# Patient Record
Sex: MALE | Race: ASIAN | Employment: FULL TIME | URBAN - METROPOLITAN AREA
[De-identification: names, ages, dates, MRNs, and addresses within clinical notes are randomized per-mention and may not be internally consistent; named-entity substitution may affect disease eponyms.]

---

## 2017-07-13 ENCOUNTER — OFFICE VISIT (OUTPATIENT)
Dept: FAMILY MEDICINE CLINIC | Age: 37
End: 2017-07-13

## 2017-07-13 VITALS
TEMPERATURE: 98 F | WEIGHT: 172 LBS | HEIGHT: 69 IN | SYSTOLIC BLOOD PRESSURE: 102 MMHG | HEART RATE: 64 BPM | RESPIRATION RATE: 14 BRPM | OXYGEN SATURATION: 99 % | DIASTOLIC BLOOD PRESSURE: 68 MMHG | BODY MASS INDEX: 25.48 KG/M2

## 2017-07-13 DIAGNOSIS — M25.531 RIGHT WRIST PAIN: Primary | ICD-10-CM

## 2017-07-13 DIAGNOSIS — R25.1 TREMOR: ICD-10-CM

## 2017-07-13 DIAGNOSIS — R22.31 MASS OF RIGHT WRIST: ICD-10-CM

## 2017-07-13 NOTE — MR AVS SNAPSHOT
Visit Information Date & Time Provider Department Dept. Phone Encounter #  
 7/13/2017  2:30 PM Noelle Clark, [address] [place] [address] Road [id number] Follow-up Instructions Return by phone for MRI results. Upcoming Health Maintenance Date Due Pneumococcal 19-64 Medium Risk (1 of 1 - PPSV23) 2/17/1998 INFLUENZA AGE 9 TO ADULT 8/1/2017 DTaP/Tdap/Td series (2 - Td) 3/4/2019 Allergies as of 7/13/2017  Review Complete On: 7/13/2017 By: Noelle Clark MD  
 No Known Allergies Current Immunizations  Reviewed on 7/14/2014 Name Date Tdap 3/4/2009 Not reviewed this visit You Were Diagnosed With   
  
 Codes Comments Right wrist pain    -  Primary ICD-10-CM: M25.531 ICD-9-CM: 719.43 Mass of right wrist     ICD-10-CM: R22.31 
ICD-9-CM: 719.63 Tremor     ICD-10-CM: R25.1 ICD-9-CM: 636. 0 Vitals BP Pulse Temp Resp Height(growth percentile) Weight(growth percentile) 102/68 (BP 1 Location: Left arm, BP Patient Position: Sitting) 64 98 °F (36.7 °C) (Oral) 14 5' 9\" (1.753 m) 172 lb (78 kg) SpO2 BMI Smoking Status 99% 25.4 kg/m2 Light Tobacco Smoker Vitals History BMI and BSA Data Body Mass Index Body Surface Area  
 25.4 kg/m 2 1.95 m 2 Preferred Pharmacy Pharmacy Name Phone [facility] [address] [facility], [address] [place] [address] Road [phone] Your Updated Medication List  
  
   
This list is accurate as of: 7/13/17  2:57 PM.  Always use your most recent med list.  
  
  
  
  
 acetaminophen 500 mg tablet Commonly known as:  [address] Take 2 Tabs by mouth every six (6) hours as needed for Pain. ibuprofen 800 mg tablet Commonly known as:  MOTRIN Take 1 Tab by mouth every six (6) hours as needed for Pain. ondansetron 4 mg disintegrating tablet Commonly known as:  ZOFRAN ODT Take 1 Tab by mouth every eight (8) hours as needed for Nausea. SUMAtriptan 50 mg tablet Commonly known as:  IMITREX Take 1 tablet PRN for migraine and repeat in about 2 hours if needed Follow-up Instructions Return by phone for MRI results. To-Do List   
 07/13/2017 Imaging:  MRI WRIST RT WO CONT Patient Instructions Benign Essential Tremor: Care Instructions Your Care Instructions Benign essential tremor is a medical term for shaking that you can't control. Your hand or fingers may shake when you lift a cup or point at something. Or your voice may shake when you speak. This type of tremor is not harmful. It is not caused by a stroke or Parkinson's disease. Some things can affect how much you shake. For example, drinking or eating something with caffeine may make tremors worse for a while. Some medicines also can increase tremors. These include antidepressants and too much thyroid replacement. Talk to your doctor if you think one of your medicines makes your tremors worse. If you are self-conscious about your tremors, there are some things you can do to reduce them or make them less noticeable. This includes taking medicine. Follow-up care is a key part of your treatment and safety. Be sure to make and go to all appointments, and call your doctor if you are having problems. It's also a good idea to know your test results and keep a list of the medicines you take. How can you care for yourself at home? · Take your medicines exactly as prescribed. Call your doctor if you think you are having a problem with your medicine. Some medicines that help control tremors have to be taken every day, even if you are not having tremors. You will get more details on the specific medicines your doctor prescribes. · Get plenty of rest. 
· Eat a balanced, healthy diet. · Try to reduce stress. Regular exercise and massages may help. · Limit alcohol. Heavy drinking can make your tremors worse. · Avoid drinks or foods with caffeine if they make your tremors worse. These include tea, cola, coffee, and chocolate. · Wear a heavy bracelet or watch. This adds a little weight to your hand. The extra weight may reduce tremors. · Drink from cups or glasses that are only half full. You may also want to try drinking with a straw. When should you call for help? Watch closely for changes in your health, and be sure to contact your doctor if: 
· You notice your tremors are getting worse. · You can't do your everyday activities because of your tremors. · You are sad and embarrassed about your shaking. Where can you learn more? Go to http://lia-candace.info/. Enter B746 in the search box to learn more about \"Benign Essential Tremor: Care Instructions. \" Current as of: October 14, 2016 Content Version: 11.3 © 5895-6118 [employer]. Care instructions adapted under license by [employer] (which disclaims liability or warranty for this information). If you have questions about a medical condition or this instruction, always ask your healthcare professional. [place] [address] any warranty or liability for your use of this information. Introducing [facility]! Dear Papa Weinstein: Thank you for requesting a [employer] account. Our records indicate that you already have an active [employer] account. You can access your account anytime at [email or web address] Did you know that you can access your hospital and ER discharge instructions at any time in [employer]? You can also review all of your test results from your hospital stay or ER visit. Additional Information If you have questions, please visit the Frequently Asked Questions section of the [employer] website at [email or web address] Remember, [employer] is NOT to be used for urgent needs. For medical emergencies, dial 911. Now available from your iPhone and Android! Please provide this summary of care documentation to your next provider. Your primary care clinician is listed as Vernon Fields. If you have any questions after today's visit, please call [phone].

## 2017-07-13 NOTE — PROGRESS NOTES
SUBJECTIVE  Chief Complaint   Patient presents with    Wrist Pain     right wrist pain when flexed x 1 month; + injury when he fell on wrist     Tremors     bilateral hands x 1 year     Patient presents for right ventral radial wrist pain for 1 month. He had a [address] injury during a [employer] race 1 month ago. Initially had pain that improved over a few weeks. He says that he was doing handstand pushups and had pain reoccur / worsen. He has had a swollen area that is tender. He also has noticed a tremor at certain times of the day for the past year. He has found no correlation to diet. He is on a preworkout supplement but cycled off and this did not affect it. No other dietary changes. No family history of similar. OBJECTIVE    Blood pressure 102/68, pulse 64, temperature 98 °F (36.7 °C), temperature source Oral, resp. rate 14, height 5' 9\" (1.753 m), weight 172 lb (78 kg), SpO2 99 %. General:  Alert, cooperative, well appearing, in no apparent distress. CV:  The heart sounds are regular in rate and rhythm. There is a normal S1 and S2.   Lungs: Inspiratory and expiratory efforts are full and unlabored. Skin:  No rashes over wrist.  Right wrist:  There is a ventral 2cm lump that is most prominent when he extends his wrist.  It is mildly tender. He has pain with flexion as well. He has a limitation of ROM by 5 degrees in extension. Mild tenderness of the anatomical snuffbox as well. Neuro:  No deficits. Gait is normal.  No resting tremor at this time. ASSESSMENT / PLAN    ICD-10-CM ICD-9-CM    1. Right wrist pain M25.531 719.43 MRI WRIST RT WO CONT   2. Mass of right wrist R22.31 719.63 MRI WRIST RT WO CONT   3. Tremor R25.1 781.0      Right wrist pain with possible mass - this could be a ligamentous injury like a scapholunate issues or a ganglion. He needs advanced imaging. Activity as tolerated. Tremor - monitor for now. Review benign essential tremor handout.      All chart history elements were reviewed by me at the time of the visit even though marked at time of note closure. Patient understands our medical plan. Patient has provided input and agrees with goals. Alternatives have been explained and offered. All questions answered. The patient is to call if condition worsens or fails to improve. Follow-up Disposition:  Return by phone for MRI results.

## 2017-07-13 NOTE — PATIENT INSTRUCTIONS
Benign Essential Tremor: Care Instructions  Your Care Instructions  Benign essential tremor is a medical term for shaking that you can't control. Your hand or fingers may shake when you lift a cup or point at something. Or your voice may shake when you speak. This type of tremor is not harmful. It is not caused by a stroke or Parkinson's disease. Some things can affect how much you shake. For example, drinking or eating something with caffeine may make tremors worse for a while. Some medicines also can increase tremors. These include antidepressants and too much thyroid replacement. Talk to your doctor if you think one of your medicines makes your tremors worse. If you are self-conscious about your tremors, there are some things you can do to reduce them or make them less noticeable. This includes taking medicine. Follow-up care is a key part of your treatment and safety. Be sure to make and go to all appointments, and call your doctor if you are having problems. It's also a good idea to know your test results and keep a list of the medicines you take. How can you care for yourself at home? · Take your medicines exactly as prescribed. Call your doctor if you think you are having a problem with your medicine. Some medicines that help control tremors have to be taken every day, even if you are not having tremors. You will get more details on the specific medicines your doctor prescribes. · Get plenty of rest.  · Eat a balanced, healthy diet. · Try to reduce stress. Regular exercise and massages may help. · Limit alcohol. Heavy drinking can make your tremors worse. · Avoid drinks or foods with caffeine if they make your tremors worse. These include tea, cola, coffee, and chocolate. · Wear a heavy bracelet or watch. This adds a little weight to your hand. The extra weight may reduce tremors. · Drink from cups or glasses that are only half full. You may also want to try drinking with a straw.   When should you call for help? Watch closely for changes in your health, and be sure to contact your doctor if:  · You notice your tremors are getting worse. · You can't do your everyday activities because of your tremors. · You are sad and embarrassed about your shaking. Where can you learn more? Go to http://lia-candace.info/. Enter B746 in the search box to learn more about \"Benign Essential Tremor: Care Instructions. \"  Current as of: October 14, 2016  Content Version: 11.3  © 4394-8122 [employer]. Care instructions adapted under license by [employer] (which disclaims liability or warranty for this information). If you have questions about a medical condition or this instruction, always ask your healthcare professional. [address] any warranty or liability for your use of this information.

## 2017-11-22 ENCOUNTER — HOSPITAL ENCOUNTER (OUTPATIENT)
Dept: LAB | Age: 37
Discharge: HOME | End: 2017-11-22

## 2017-11-22 ENCOUNTER — OFFICE VISIT (OUTPATIENT)
Dept: FAMILY MEDICINE CLINIC | Age: 37
End: 2017-11-22

## 2017-11-22 VITALS
DIASTOLIC BLOOD PRESSURE: 70 MMHG | SYSTOLIC BLOOD PRESSURE: 114 MMHG | TEMPERATURE: 98 F | WEIGHT: 176 LBS | OXYGEN SATURATION: 98 % | BODY MASS INDEX: 26.07 KG/M2 | RESPIRATION RATE: 14 BRPM | HEIGHT: 69 IN | HEART RATE: 70 BPM

## 2017-11-22 DIAGNOSIS — Z23 ENCOUNTER FOR IMMUNIZATION: ICD-10-CM

## 2017-11-22 DIAGNOSIS — N52.9 ERECTILE DYSFUNCTION, UNSPECIFIED ERECTILE DYSFUNCTION TYPE: ICD-10-CM

## 2017-11-22 DIAGNOSIS — R40.0 DAYTIME SLEEPINESS: ICD-10-CM

## 2017-11-22 DIAGNOSIS — R68.82 DECREASED LIBIDO: ICD-10-CM

## 2017-11-22 DIAGNOSIS — Z13.220 SCREENING CHOLESTEROL LEVEL: ICD-10-CM

## 2017-11-22 DIAGNOSIS — Z00.00 ROUTINE MEDICAL EXAM: Primary | ICD-10-CM

## 2017-11-22 PROCEDURE — 99001 SPECIMEN HANDLING PT-LAB: CPT | Performed by: FAMILY MEDICINE

## 2017-11-22 NOTE — PATIENT INSTRUCTIONS
Well Visit, Ages 25 to 48: Care Instructions  Your Care Instructions    Physical exams can help you stay healthy. Your doctor has checked your overall health and may have suggested ways to take good care of yourself. He or she also may have recommended tests. At home, you can help prevent illness with healthy eating, regular exercise, and other steps. Follow-up care is a key part of your treatment and safety. Be sure to make and go to all appointments, and call your doctor if you are having problems. It's also a good idea to know your test results and keep a list of the medicines you take. How can you care for yourself at home? · Reach and stay at a healthy weight. This will lower your risk for many problems, such as obesity, diabetes, heart disease, and high blood pressure. · Get at least 30 minutes of physical activity on most days of the week. Walking is a good choice. You also may want to do other activities, such as running, swimming, cycling, or playing tennis or team sports. Discuss any changes in your exercise program with your doctor. · Do not smoke or allow others to smoke around you. If you need help quitting, talk to your doctor about stop-smoking programs and medicines. These can increase your chances of quitting for good. · Talk to your doctor about whether you have any risk factors for sexually transmitted infections (STIs). Having one sex partner (who does not have STIs and does not have sex with anyone else) is a good way to avoid these infections. · Use birth control if you do not want to have children at this time. Talk with your doctor about the choices available and what might be best for you. · Protect your skin from too much sun. When you're outdoors from 10 a.m. to 4 p.m., stay in the shade or cover up with clothing and a hat with a wide brim. Wear sunglasses that block UV rays. Even when it's cloudy, put broad-spectrum sunscreen (SPF 30 or higher) on any exposed skin.   · See a dentist one or two times a year for checkups and to have your teeth cleaned. · Wear a seat belt in the car. · Drink alcohol in moderation, if at all. That means no more than 2 drinks a day for men and 1 drink a day for women. Follow your doctor's advice about when to have certain tests. These tests can spot problems early. For everyone  · Cholesterol. Have the fat (cholesterol) in your blood tested after age 21. Your doctor will tell you how often to have this done based on your age, family history, or other things that can increase your risk for heart disease. · Blood pressure. Have your blood pressure checked during a routine doctor visit. Your doctor will tell you how often to check your blood pressure based on your age, your blood pressure results, and other factors. · Vision. Talk with your doctor about how often to have a glaucoma test.  · Diabetes. Ask your doctor whether you should have tests for diabetes. · Colon cancer. Have a test for colon cancer at age 48. You may have one of several tests. If you are younger than 48, you may need a test earlier if you have any risk factors. Risk factors include whether you already had a precancerous polyp removed from your colon or whether your parent, brother, sister, or child has had colon cancer. For women  · Breast exam and mammogram. Talk to your doctor about when you should have a clinical breast exam and a mammogram. Medical experts differ on whether and how often women under 50 should have these tests. Your doctor can help you decide what is right for you. · Pap test and pelvic exam. Begin Pap tests at age 24. A Pap test is the best way to find cervical cancer. The test often is part of a pelvic exam. Ask how often to have this test.  · Tests for sexually transmitted infections (STIs). Ask whether you should have tests for STIs. You may be at risk if you have sex with more than one person, especially if your partners do not wear condoms.   For men  · Tests for sexually transmitted infections (STIs). Ask whether you should have tests for STIs. You may be at risk if you have sex with more than one person, especially if you do not wear a condom. · Testicular cancer exam. Ask your doctor whether you should check your testicles regularly. · Prostate exam. Talk to your doctor about whether you should have a blood test (called a PSA test) for prostate cancer. Experts differ on whether and when men should have this test. Some experts suggest it if you are older than 39 and are -American or have a father or brother who got prostate cancer when he was younger than 72. When should you call for help? Watch closely for changes in your health, and be sure to contact your doctor if you have any problems or symptoms that concern you. Where can you learn more? Go to http://lia-candace.info/. Enter P072 in the search box to learn more about \"Well Visit, Ages 25 to 48: Care Instructions. \"  Current as of: May 12, 2017  Content Version: 11.4  © 8298-5878 [employer]. Care instructions adapted under license by [employer] (which disclaims liability or warranty for this information). If you have questions about a medical condition or this instruction, always ask your healthcare professional. [place] [address] any warranty or liability for your use of this information. Testicular Self-Exam: Care Instructions  Your Care Instructions    A self-exam is a way for you to check for cancer of the testicles. Although testicular cancer is rare, it is one of the most common tumors in men younger than 28. Many testicular cancers are found during self-exam. In the early stages of testicular cancer, the lump, which may be about the size of a pea, usually is not painful. Testicular cancer, especially if treated early, is very often cured.   By doing this self-exam regularly, you can learn the normal size, shape, and weight of your testicles. This allows you to note any changes. Follow-up care is a key part of your treatment and safety. Be sure to make and go to all appointments, and call your doctor if you are having problems. It's also a good idea to know your test results and keep a list of the medicines you take. How can you care for yourself at home? · The exam is best done during or after a bath or shower-when the scrotum, the skin sac that holds the testicles, is relaxed. · Stand and place your right leg on a raised surface about chair height. Then gently feel your scrotum until you find the right testicle. · Roll the testicle gently but firmly between your thumb and fingers of both hands. Carefully feel the surface for lumps. Feel for any change in the size, shape, or texture of the testicle. The testicle should feel round and smooth. It is normal for one testicle to be slightly larger than the other one. · Repeat this for the left side. Feel the entire surface of both testicles. · You may feel the epididymis, the soft tube behind each testicle. Become familiar with this structure so that you won't mistake it for a lump. When should you call for help? Call your doctor now or seek immediate medical care if:  ? · You have pain in a testicle. ? Watch closely for changes in your health, and be sure to contact your doctor if:  ? · You notice a change in a testicle. ? · You notice a lump in a testicle. Where can you learn more? Go to http://lia-candace.info/. Enter E880 in the search box to learn more about \"Testicular Self-Exam: Care Instructions. \"  Current as of: March 14, 2017  Content Version: 11.4  © 7569-5790 [employer]. Care instructions adapted under license by [employer] (which disclaims liability or warranty for this information).  If you have questions about a medical condition or this instruction, always ask your healthcare professional. [employer] disclaims any warranty or liability for your use of this information. [facility]  Address: [address], [place], [facility] [address]  Phone: [phone]     Influenza (Flu) Vaccine (Inactivated or Recombinant): What You Need to Know  Why get vaccinated? Influenza (\"flu\") is a contagious disease that spreads around the [place] every winter, usually between October and May. Flu is caused by influenza viruses and is spread mainly by coughing, sneezing, and close contact. Anyone can get flu. Flu strikes suddenly and can last several days. Symptoms vary by age, but can include:  · Fever/chills. · Sore throat. · Muscle aches. · Fatigue. · Cough. · Headache. · Runny or stuffy nose. Flu can also lead to pneumonia and blood infections, and cause diarrhea and seizures in children. If you have a medical condition, such as heart or lung disease, flu can make it worse. Flu is more dangerous for some people. Infants and young children, people 72years of age and older, pregnant women, and people with certain health conditions or a weakened immune system are at greatest risk. Each year thousands of people in the [facility] die from flu, and many more are hospitalized. Flu vaccine can:  · Keep you from getting flu. · Make flu less severe if you do get it. · Keep you from spreading flu to your family and other people. Inactivated and recombinant flu vaccines  A dose of flu vaccine is recommended every flu season. Children 6 months through 6years of age may need two doses during the same flu season. Everyone else needs only one dose each flu season. Some inactivated flu vaccines contain a very small amount of a mercury-based preservative called thimerosal. Studies have not shown thimerosal in vaccines to be harmful, but flu vaccines that do not contain thimerosal are available. There is no live flu virus in flu shots. They cannot cause the flu.   There are many flu viruses, and they are always changing. Each year a new flu vaccine is made to protect against three or four viruses that are likely to cause disease in the upcoming flu season. But even when the vaccine doesn't exactly match these viruses, it may still provide some protection. Flu vaccine cannot prevent:  · Flu that is caused by a virus not covered by the vaccine. · Illnesses that look like flu but are not. Some people should not get this vaccine  Tell the person who is giving you the vaccine:  · If you have any severe (life-threatening) allergies. If you ever had a life-threatening allergic reaction after a dose of flu vaccine, or have a severe allergy to any part of this vaccine, you may be advised not to get vaccinated. Most, but not all, types of flu vaccine contain a small amount of egg protein. · If you ever had Guillain-Barré syndrome (also called GBS) Some people with a history of GBS should not get this vaccine. This should be discussed with your doctor. · If you are not feeling well. It is usually okay to get flu vaccine when you have a mild illness, but you might be asked to come back when you feel better. Risks of a vaccine reaction  With any medicine, including vaccines, there is a chance of reactions. These are usually mild and go away on their own, but serious reactions are also possible. Most people who get a flu shot do not have any problems with it. Minor problems following a flu shot include:  · Soreness, redness, or swelling where the shot was given  · Hoarseness  · Sore, red or itchy eyes  · Cough  · Fever  · Aches  · Headache  · Itching  · Fatigue  If these problems occur, they usually begin soon after the shot and last 1 or 2 days. More serious problems following a flu shot can include the following:  · There may be a small increased risk of Guillain-Barré Syndrome (GBS) after inactivated flu vaccine.  This risk has been estimated at 1 or 2 additional cases per million people vaccinated. This is much lower than the risk of severe complications from flu, which can be prevented by flu vaccine. · The Dr. Z children who get the flu shot along with pneumococcal vaccine (PCV13) and/or DTaP vaccine at the same time might be slightly more likely to have a seizure caused by fever. Ask your doctor for more information. Tell your doctor if a child who is getting flu vaccine has ever had a seizure  Problems that could happen after any injected vaccine:  · People sometimes faint after a medical procedure, including vaccination. Sitting or lying down for about 15 minutes can help prevent fainting, and injuries caused by a fall. Tell your doctor if you feel dizzy, or have vision changes or ringing in the ears. · Some people get severe pain in the shoulder and have difficulty moving the arm where a shot was given. This happens very rarely. · Any medication can cause a severe allergic reaction. Such reactions from a vaccine are very rare, estimated at about 1 in a million doses, and would happen within a few minutes to a few hours after the vaccination. As with any medicine, there is a very remote chance of a vaccine causing a serious injury or death. The safety of vaccines is always being monitored. For more information, visit: www.cdc.gov/vaccinesafety/. What if there is a serious reaction? What should I look for? · Look for anything that concerns you, such as signs of a severe allergic reaction, very high fever, or unusual behavior. Signs of a severe allergic reaction can include hives, swelling of the face and throat, difficulty breathing, a fast heartbeat, dizziness, and weakness - usually within a few minutes to a few hours after the vaccination. What should I do? · If you think it is a severe allergic reaction or other emergency that can't wait, call 9-1-1 and get the person to the nearest hospital. Otherwise, call your doctor.   · Reactions should be reported to the \"Vaccine Adverse Event Reporting System\" (VAERS). Your doctor should file this report, or you can do it yourself through the VAERS website at www.vaers. [facility].gov, or by calling 2-109.336.7624. VAERS does not give medical advice. The National Vaccine Injury Compensation Program  The National Vaccine Injury Compensation Program (VICP) is a federal program that was created to compensate people who may have been injured by certain vaccines. Persons who believe they may have been injured by a vaccine can learn about the program and about filing a claim by calling 4-415.434.4163 or visiting the [employer] website at www.[facility].gov/vaccinecompensation. There is a time limit to file a claim for compensation. How can I learn more? · Ask your healthcare provider. He or she can give you the vaccine package insert or suggest other sources of information. · Call your local or state health department. · Contact the Centers for Disease Control and Prevention (CDC):  ¨ Call 2-826.310.1789 (1-800-CDC-INFO) or  ¨ Visit CDC's website at www.cdc.gov/flu  Vaccine Information Statement  Inactivated Influenza Vaccine  8/7/2015)  42 JOHN Shankar 119LT-74  Department of Health and Human Services  Centers for Disease Control and Prevention  Many Vaccine Information Statements are available in [place] and other languages. See www.immunize.org/vis. Muchas hojas de información sobre vacunas están disponibles en español y en otros idiomas. Visite www.immunize.org/vis. Care instructions adapted under license by [employer] (which disclaims liability or warranty for this information). If you have questions about a medical condition or this instruction, always ask your healthcare professional. [place] [address] any warranty or liability for your use of this information.

## 2017-11-22 NOTE — PROGRESS NOTES
Subjective:   Radha Garcia is a 44 y.o. male presenting for his annual checkup. ROS:  Feeling well. Has had some sensation of throat fullness and morning time cough. Has not tried anything. No dyspnea or chest pain on exertion. No abdominal pain, change in bowel habits, black or bloody stools. No urinary tract symptoms. Has had difficulty maintaining erections and decreased sex drive. No neurological complaints. Has had daytime sleepiness and has had snoring. Curious about sleep apnea. Pain in arches of feet during jumping jacks only. Specific concerns today:   See ROS. No Known Allergies  History reviewed. No pertinent past medical history. Past Surgical History:   Procedure Laterality Date    HX WISDOM TEETH EXTRACTION       Family History   Problem Relation Age of Onset    Diabetes Mother     Heart Disease Father      CABG in his 62s     Social History   Substance Use Topics    Smoking status: Never Smoker    Smokeless tobacco: Never Used      Comment: Hookah axp 1 a month    Alcohol use Yes      Comment: Occasionally      Objective:     Visit Vitals    /70 (BP 1 Location: Left arm, BP Patient Position: Sitting)    Pulse 70    Temp 98 °F (36.7 °C) (Oral)    Resp 14    Ht 5' 9\" (1.753 m)    Wt 176 lb (79.8 kg)    SpO2 98%    BMI 25.99 kg/m2     The patient appears well, alert, oriented x 3, in no distress. ENT normal with the exception of mild nasal turbinates being engorged and posterior pharyngeal cobblestoning. Neck supple. No adenopathy or thyromegaly. ARTHUR. Lungs are clear, good air entry, no wheezes, rhonchi or rales. S1 and S2 normal, no murmurs, regular rate and rhythm. Abdomen is soft without tenderness, guarding, mass or organomegaly.  exam: self testicular handout. Feet:  Bilateral pes planus (mild). Extremities show no edema, normal peripheral pulses. Neurological is normal without focal findings.     Assessment/Plan:       ICD-10-CM ICD-9-CM 1. Routine medical exam Z00.00 V70.0    2. Decreased libido R68.82 799.81 CBC W/O DIFF      METABOLIC PANEL, COMPREHENSIVE   3. Erectile dysfunction, unspecified erectile dysfunction type N52.9 607.84 TESTOSTERONE, FREE & TOTAL      CBC W/O DIFF      METABOLIC PANEL, COMPREHENSIVE   4. Daytime sleepiness R40.0 780.54 REFERRAL TO SLEEP STUDIES   5. Screening cholesterol level Z13.220 V77.91 LIPID PANEL   6. Encounter for immunization Z23 V03.89 IN IMMUNIZ ADMIN,1 SINGLE/COMB VAC/TOXOID      INFLUENZA VIRUS VAC QUAD,SPLIT,PRESV FREE SYRINGE IM     Age and sex specific counseling. Trial of claritin or flonase for postnasal symptoms. Blanco Sleep Scale score was 14. Referred to sleep specialist.  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 Flu vaccine administered. Fasting labs ordered. All chart history elements were reviewed by me at the time of the visit even though marked at time of note closure. Patient understands our medical plan. Patient has provided input and agrees with goals. Alternatives have been explained and offered. All questions answered. The patient is to call if condition worsens or fails to improve. Follow-up Disposition:  Return in about 1 year (around 11/22/2018) for physical.  Labs due today.

## 2017-11-22 NOTE — PROGRESS NOTES
Chief Complaint   Patient presents with    Physical     1. Have you been to the ER, urgent care clinic since your last visit? Hospitalized since your last visit? No    2. Have you seen or consulted any other health care providers outside of the [address] [place] [address] since your last visit? Include any pap smears or colon screening. No    Physician order obtained. Patient completed adult immunization consent form. Allergies, contraindications and recommendations reviewed with patient. Seasonal influenza vaccine administered IM right deltoid. Patient tolerated well. Patient remained in office for 15 minutes after injection and no adverse reactions were noted.

## 2017-11-25 LAB
ALBUMIN SERPL-MCNC: 4.6 G/DL (ref 3.5–5.5)
ALBUMIN/GLOB SERPL: 2 {RATIO} (ref 1.2–2.2)
ALP SERPL-CCNC: 68 IU/L (ref 39–117)
ALT SERPL-CCNC: 20 IU/L (ref 0–44)
AST SERPL-CCNC: 34 IU/L (ref 0–40)
BILIRUB SERPL-MCNC: 0.3 MG/DL (ref 0–1.2)
BUN SERPL-MCNC: 18 MG/DL (ref 6–20)
BUN/CREAT SERPL: 17 (ref 9–20)
CALCIUM SERPL-MCNC: 9.6 MG/DL (ref 8.7–10.2)
CHLORIDE SERPL-SCNC: 101 MMOL/L (ref 96–106)
CHOLEST SERPL-MCNC: 165 MG/DL (ref 100–199)
CO2 SERPL-SCNC: 24 MMOL/L (ref 18–29)
CREAT SERPL-MCNC: 1.04 MG/DL (ref 0.76–1.27)
ERYTHROCYTE [DISTWIDTH] IN BLOOD BY AUTOMATED COUNT: 13.1 % (ref 12.3–15.4)
GFR SERPLBLD CREATININE-BSD FMLA CKD-EPI: 105 ML/MIN/1.73
GFR SERPLBLD CREATININE-BSD FMLA CKD-EPI: 91 ML/MIN/1.73
GLOBULIN SER CALC-MCNC: 2.3 G/DL (ref 1.5–4.5)
GLUCOSE SERPL-MCNC: 91 MG/DL (ref 65–99)
HCT VFR BLD AUTO: 47.1 % (ref 37.5–51)
HDLC SERPL-MCNC: 64 MG/DL
HGB BLD-MCNC: 15.2 G/DL (ref 12.6–17.7)
INTERPRETATION, 910389: NORMAL
LDLC SERPL CALC-MCNC: 82 MG/DL (ref 0–99)
MCH RBC QN AUTO: 30.7 PG (ref 26.6–33)
MCHC RBC AUTO-ENTMCNC: 32.3 G/DL (ref 31.5–35.7)
MCV RBC AUTO: 95 FL (ref 79–97)
PLATELET # BLD AUTO: 333 X10E3/UL (ref 150–379)
POTASSIUM SERPL-SCNC: 4.8 MMOL/L (ref 3.5–5.2)
PROT SERPL-MCNC: 6.9 G/DL (ref 6–8.5)
RBC # BLD AUTO: 4.95 X10E6/UL (ref 4.14–5.8)
SODIUM SERPL-SCNC: 139 MMOL/L (ref 134–144)
TESTOST FREE SERPL-MCNC: 19.8 PG/ML (ref 8.7–25.1)
TESTOST SERPL-MCNC: 561 NG/DL (ref 264–916)
TRIGL SERPL-MCNC: 96 MG/DL (ref 0–149)
VLDLC SERPL CALC-MCNC: 19 MG/DL (ref 5–40)
WBC # BLD AUTO: 6.2 X10E3/UL (ref 3.4–10.8)

## 2017-12-04 RX ORDER — TADALAFIL 10 MG/1
10 TABLET ORAL
Qty: 6 TAB | Refills: 0 | Status: SHIPPED | OUTPATIENT
Start: 2017-12-04 | End: 2019-05-22 | Stop reason: ALTCHOICE

## 2017-12-22 ENCOUNTER — TELEPHONE (OUTPATIENT)
Dept: FAMILY MEDICINE CLINIC | Age: 37
End: 2017-12-22

## 2017-12-22 NOTE — TELEPHONE ENCOUNTER
[employer] faxed prior authorization request for tadalafil (CIALIS) 10 mg tablet. Please call insurance, Ph# [phone].

## 2017-12-28 NOTE — TELEPHONE ENCOUNTER
Spoke with Shary Soulier at [employer]. Clinical information given for prior authorization for patient's Cialis. Reference # [id number]. Prior authorization under clinical review. Office to be notified via fax.

## 2018-02-20 NOTE — MR AVS SNAPSHOT
History of Present Illness





- General


Chief Complaint: SIRS, Suspected/Possible


Stated Complaint: FEVER


Time Seen by Provider: 02/20/18 16:59


History Source: Patient, Parent(s)


Exam Limitations: No Limitations





- History of Present Illness


Initial Comments: 





02/20/18 18:32


Is brought child in for evaluation of fevers and recurrent cough.  was 

hospitalized at [place] 3 weeks ago for upper respiratory infection and asthma 

exacerbation and were concerned was having recurrence of this.  has been 

using Tylenol and albuterol nebulizers at home, with some resolved. Admits this 

is not as severe as previous illness but did not want to have a re-

exacerbation. I sore throat, denies runny nose, denies generalized body aches.


Severity: reports: mild, moderate


Associated Symptoms: reports: cough, nasal congestion, nasal drainage, sore 

throat





Past History





- Travel


Traveled outside of the country in the last 30 days: No


Close contact w/someone who was outside of country & ill: No





- Past Medical History


Allergies/Adverse Reactions: 


 Allergies











Allergy/AdvReac Type Severity Reaction Status Date / Time


 


No Known Allergies Allergy   Verified 02/20/18 17:12











Home Medications: 


Ambulatory Orders





Albuterol 0.083% Nebulizer Sol [Ventolin 0.083%] 1 neb NEB Q4H 02/20/18 


Cetirizine HCl [Allergy Relief] 1 mg PO ASDIR 02/20/18 


Ibuprofen Oral Suspension [Motrin Oral Suspension -] 300 mg PO Q6H PRN #120 ml 

02/20/18 


Montelukast Na [Singulair -] 5 mg PO HS 02/20/18 











- Immunization History


Immunization Up to Date: Yes





- Suicide/Smoking/Psychosocial Hx


Smoking History: Never smoked


Have you smoked in the past 12 months: No


Hx Alcohol Use: No


Drug/Substance Use Hx: No


Substance Use Type: None





**Review of Systems





- Review of Systems


Able to Perform ROS?: Yes


Is the patient limited English proficient: Yes


Constitutional: Yes: Symptoms Reported, See HPI, Malaise


HEENTM: Yes: Symptoms Reported


Respiratory: Yes: Symptoms reported, See HPI, Cough.  No: Orthopnea, Wheezing


Integumentary: Yes: Symptoms Reported


All Other Systems: Reviewed and Negative





*Physical Exam





- Vital Signs


 Last Vital Signs











Temp Pulse Resp BP Pulse Ox


 


 102.7 F H  130 H  20   100/44   96 


 


 02/20/18 17:12  02/20/18 17:12  02/20/18 17:12  02/20/18 17:12  02/20/18 17:12














- Physical Exam


General Appearance: Yes: Nourished, Appropriately Dressed.  No: Apparent 

Distress


HEENT: positive: DAVE, Normal ENT Inspection, TMs Normal (congested but 

landmarks easily visualized), Rhinorrhea.  negative: Pharynx Normal, Pharyngeal 

Erythema, Sinus Tenderness


Neck: positive: Supple.  negative: Lymphadenopathy (R), Lymphadenopathy (L)


Respiratory/Chest: positive: Lungs Clear, Normal Breath Sounds.  negative: 

Rhonchi, Wheezing


Gastrointestinal/Abdominal: positive: Normal Bowel Sounds, Soft


Musculoskeletal: positive: Normal Inspection


Extremity: positive: Normal Capillary Refill, Normal Inspection, Normal Range 

of Motion


Integumentary: positive: Dry


Neurologic: positive: CNs II-XII NML intact, Fully Oriented, Alert, Normal Mood/

Affect, Normal Response, Motor Strength 5/5





ED Treatment Course





- Medications


Given in the ED: 


ED Medications














Discontinued Medications














Generic Name Dose Route Start Last Admin





  Trade Name Freq  PRN Reason Stop Dose Admin


 


Ibuprofen  400 mg  02/20/18 17:03  02/20/18 17:25





  Motrin Oral Suspension -  PO  02/20/18 17:04  400 mg





  ONCE ONE   Administration














Progress Note





- Progress Note


Progress Note: 





Upper respiratory infection, mild. No wheezing or no re-exacerbation of asthma 

noted. Probably not influenza as patient has no other symptoms other than moist 

cough and fevers. We'll continue treating conservatively.





*DC/Admit/Observation/Transfer


Diagnosis at time of Disposition: 


 Common cold virus








- Discharge Dispostion


Disposition: HOME


Condition at time of disposition: Stable


Admit: No





- Referrals


Referrals: 


Demetri Gonzalez [Primary Care Provider] - 





- Patient Instructions


Printed Discharge Instructions:  DI for Viral Upper Respiratory Infection-Child


Additional Instructions: 


Rest, drink lots of fluids: Teas, water, soups, Pedialyte


Saltwater gargles


Steamy showers/seem to face break up mucus


Avoid contact with others until fevers and cough resolved


Lots of handwashing and good hygiene





Continue over-the-counter medications for symptomatic relief


Tylenol or Motrin for fever and pain


Continue albuterol nebulizers every 4-6 hours for the next 2 days then as 

needed for continued cough





Followup with private physician in one to 2 days 


Return to emergency department / pediatric hospital for worsened symptoms, 

fevers, dehydration





- Post Discharge Activity Visit Information Date & Time Provider Department Dept. Phone Encounter #  
 11/22/2017  8:15 AM Jose Pino, [address] [id number] Follow-up Instructions Return in about 1 year (around 11/22/2018) for physical.  Labs due today. Upcoming Health Maintenance Date Due Pneumococcal 19-64 Medium Risk (1 of 1 - PPSV23) 2/17/1998 DTaP/Tdap/Td series (2 - Td) 3/4/2019 Allergies as of 11/22/2017  Review Complete On: 11/22/2017 By: Jose Pino MD  
 No Known Allergies Current Immunizations  Reviewed on 7/14/2014 Name Date Tdap 3/4/2009 Not reviewed this visit You Were Diagnosed With   
  
 Codes Comments Routine medical exam    -  Primary ICD-10-CM: Z00.00 ICD-9-CM: V70.0 Decreased libido     ICD-10-CM: U04.21 
ICD-9-CM: 799.81 Erectile dysfunction, unspecified erectile dysfunction type     ICD-10-CM: N52.9 ICD-9-CM: 607.84 Screening cholesterol level     ICD-10-CM: J54.415 ICD-9-CM: V77.91 Vitals BP Pulse Temp Resp Height(growth percentile) Weight(growth percentile) 114/70 (BP 1 Location: Left arm, BP Patient Position: Sitting) 70 98 °F (36.7 °C) (Oral) 14 5' 9\" (1.753 m) 176 lb (79.8 kg) SpO2 BMI Smoking Status 98% 25.99 kg/m2 Never Smoker Vitals History BMI and BSA Data Body Mass Index Body Surface Area  
 25.99 kg/m 2 1.97 m 2 Preferred Pharmacy Pharmacy Name Phone [facility] [address] [facility], [address] [place] [address] Road [phone] Your Updated Medication List  
  
Notice  As of 11/22/2017  8:49 AM  
 You have not been prescribed any medications. Follow-up Instructions Return in about 1 year (around 11/22/2018) for physical.  Labs due today. To-Do List   
 11/22/2017 Lab:  CBC W/O DIFF   
  
 11/22/2017 Lab:  LIPID PANEL   
  
 11/22/2017 Lab:  METABOLIC PANEL, COMPREHENSIVE   
  
 11/22/2017 Lab:  TESTOSTERONE, FREE & TOTAL Patient Instructions Well Visit, Ages 25 to 48: Care Instructions Your Care Instructions Physical exams can help you stay healthy. Your doctor has checked your overall health and may have suggested ways to take good care of yourself. He or she also may have recommended tests. At home, you can help prevent illness with healthy eating, regular exercise, and other steps. Follow-up care is a key part of your treatment and safety. Be sure to make and go to all appointments, and call your doctor if you are having problems. It's also a good idea to know your test results and keep a list of the medicines you take. How can you care for yourself at home? · Reach and stay at a healthy weight. This will lower your risk for many problems, such as obesity, diabetes, heart disease, and high blood pressure. · Get at least 30 minutes of physical activity on most days of the week. Walking is a good choice. You also may want to do other activities, such as running, swimming, cycling, or playing tennis or team sports. Discuss any changes in your exercise program with your doctor. · Do not smoke or allow others to smoke around you. If you need help quitting, talk to your doctor about stop-smoking programs and medicines. These can increase your chances of quitting for good. · Talk to your doctor about whether you have any risk factors for sexually transmitted infections (STIs). Having one sex partner (who does not have STIs and does not have sex with anyone else) is a good way to avoid these infections. · Use birth control if you do not want to have children at this time. Talk with your doctor about the choices available and what might be best for you. · Protect your skin from too much sun. When you're outdoors from 10 a.m. to 4 p.m., stay in the shade or cover up with clothing and a hat with a wide brim. Wear sunglasses that block UV rays.  Even when it's cloudy, put broad-spectrum sunscreen (SPF 30 or higher) on any exposed skin. · See a dentist one or two times a year for checkups and to have your teeth cleaned. · Wear a seat belt in the car. · Drink alcohol in moderation, if at all. That means no more than 2 drinks a day for men and 1 drink a day for women. Follow your doctor's advice about when to have certain tests. These tests can spot problems early. For everyone · Cholesterol. Have the fat (cholesterol) in your blood tested after age 21. Your doctor will tell you how often to have this done based on your age, family history, or other things that can increase your risk for heart disease. · Blood pressure. Have your blood pressure checked during a routine doctor visit. Your doctor will tell you how often to check your blood pressure based on your age, your blood pressure results, and other factors. · Vision. Talk with your doctor about how often to have a glaucoma test. 
· Diabetes. Ask your doctor whether you should have tests for diabetes. · Colon cancer. Have a test for colon cancer at age 48. You may have one of several tests. If you are younger than 48, you may need a test earlier if you have any risk factors. Risk factors include whether you already had a precancerous polyp removed from your colon or whether your parent, brother, sister, or child has had colon cancer. For women · Breast exam and mammogram. Talk to your doctor about when you should have a clinical breast exam and a mammogram. Medical experts differ on whether and how often women under 50 should have these tests. Your doctor can help you decide what is right for you. · Pap test and pelvic exam. Begin Pap tests at age 24. A Pap test is the best way to find cervical cancer. The test often is part of a pelvic exam. Ask how often to have this test. 
· Tests for sexually transmitted infections (STIs).  Ask whether you should have tests for STIs. You may be at risk if you have sex with more than one person, especially if your partners do not wear condoms. For men · Tests for sexually transmitted infections (STIs). Ask whether you should have tests for STIs. You may be at risk if you have sex with more than one person, especially if you do not wear a condom. · Testicular cancer exam. Ask your doctor whether you should check your testicles regularly. · Prostate exam. Talk to your doctor about whether you should have a blood test (called a PSA test) for prostate cancer. Experts differ on whether and when men should have this test. Some experts suggest it if you are older than 39 and are -American or have a father or brother who got prostate cancer when he was younger than 72. When should you call for help? Watch closely for changes in your health, and be sure to contact your doctor if you have any problems or symptoms that concern you. Where can you learn more? Go to http://lia-candace.info/. Enter P072 in the search box to learn more about \"Well Visit, Ages 25 to 48: Care Instructions. \" Current as of: May 12, 2017 Content Version: 11.4 © 5774-6995 [employer]. Care instructions adapted under license by [employer] (which disclaims liability or warranty for this information). If you have questions about a medical condition or this instruction, always ask your healthcare professional. [address] any warranty or liability for your use of this information. Testicular Self-Exam: Care Instructions Your Care Instructions A self-exam is a way for you to check for cancer of the testicles. Although testicular cancer is rare, it is one of the most common tumors in men younger than 28.  
Many testicular cancers are found during self-exam. In the early stages of testicular cancer, the lump, which may be about the size of a pea, usually is not painful. Testicular cancer, especially if treated early, is very often cured. By doing this self-exam regularly, you can learn the normal size, shape, and weight of your testicles. This allows you to note any changes. Follow-up care is a key part of your treatment and safety. Be sure to make and go to all appointments, and call your doctor if you are having problems. It's also a good idea to know your test results and keep a list of the medicines you take. How can you care for yourself at home? · The exam is best done during or after a bath or shower-when the scrotum, the skin sac that holds the testicles, is relaxed. · Stand and place your right leg on a raised surface about chair height. Then gently feel your scrotum until you find the right testicle. · Roll the testicle gently but firmly between your thumb and fingers of both hands. Carefully feel the surface for lumps. Feel for any change in the size, shape, or texture of the testicle. The testicle should feel round and smooth. It is normal for one testicle to be slightly larger than the other one. · Repeat this for the left side. Feel the entire surface of both testicles. · You may feel the epididymis, the soft tube behind each testicle. Become familiar with this structure so that you won't mistake it for a lump. When should you call for help? Call your doctor now or seek immediate medical care if: 
? · You have pain in a testicle. ? Watch closely for changes in your health, and be sure to contact your doctor if: 
? · You notice a change in a testicle. ? · You notice a lump in a testicle. Where can you learn more? Go to http://lia-candace.info/. Enter K908 in the search box to learn more about \"Testicular Self-Exam: Care Instructions. \" Current as of: March 14, 2017 Content Version: 11.4 © 7243-2935 [employer].  Care instructions adapted under license by [address] (which disclaims liability or warranty for this information). If you have questions about a medical condition or this instruction, always ask your healthcare professional. [address] any warranty or liability for your use of this information. [facility] Address: [address] [facility], [facility] [address] Phone: [phone] [facility] [place] & [facility] SERVICES! Dear Violet Parrish: Thank you for requesting a i[employer]ulfillment account. Our records indicate that you already have an active iFulfillment account. You can access your account anytime at [email or web address] Did you know that you can access your hospital and ER discharge instructions at any time in iFulfillment? You can also review all of your test results from your hospital stay or ER visit. Additional Information If you have questions, please visit the Frequently Asked Questions section of the iFulfillment website at [email or web address] Remember, iFulfillment is NOT to be used for urgent needs. For medical emergencies, dial 911. Now available from your iPhone and Android! Please provide this summary of care documentation to your next provider. Your primary care clinician is listed as Deanne Armijo. If you have any questions after today's visit, please call [phone].

## 2018-05-15 ENCOUNTER — OFFICE VISIT (OUTPATIENT)
Dept: FAMILY MEDICINE CLINIC | Age: 38
End: 2018-05-15

## 2018-05-15 VITALS
TEMPERATURE: 98.2 F | BODY MASS INDEX: 25.48 KG/M2 | HEIGHT: 69 IN | HEART RATE: 72 BPM | WEIGHT: 172 LBS | SYSTOLIC BLOOD PRESSURE: 118 MMHG | RESPIRATION RATE: 16 BRPM | OXYGEN SATURATION: 98 % | DIASTOLIC BLOOD PRESSURE: 64 MMHG

## 2018-05-15 DIAGNOSIS — J40 BRONCHITIS: Primary | ICD-10-CM

## 2018-05-15 RX ORDER — AZITHROMYCIN 250 MG/1
TABLET, FILM COATED ORAL
Qty: 6 TAB | Refills: 0 | Status: SHIPPED | OUTPATIENT
Start: 2018-05-15 | End: 2019-05-22 | Stop reason: ALTCHOICE

## 2018-05-15 NOTE — PATIENT INSTRUCTIONS
Bronchitis: Care Instructions  Your Care Instructions    Bronchitis is inflammation of the bronchial tubes, which carry air to the lungs. The tubes swell and produce mucus, or phlegm. The mucus and inflamed bronchial tubes make you cough. You may have trouble breathing. Most cases of bronchitis are caused by viruses like those that cause colds. Antibiotics usually do not help and they may be harmful. Bronchitis usually develops rapidly and lasts about 2 to 3 weeks in otherwise healthy people. Follow-up care is a key part of your treatment and safety. Be sure to make and go to all appointments, and call your doctor if you are having problems. It's also a good idea to know your test results and keep a list of the medicines you take. How can you care for yourself at home? · Take all medicines exactly as prescribed. Call your doctor if you think you are having a problem with your medicine. · Get some extra rest.  · Take an over-the-counter pain medicine, such as acetaminophen (Tylenol), ibuprofen (Advil, Motrin), or naproxen (Aleve) to reduce fever and relieve body aches. Read and follow all instructions on the label. · Do not take two or more pain medicines at the same time unless the doctor told you to. Many pain medicines have acetaminophen, which is Tylenol. Too much acetaminophen (Tylenol) can be harmful. · Take an over-the-counter cough medicine that contains dextromethorphan to help quiet a dry, hacking cough so that you can sleep. Avoid cough medicines that have more than one active ingredient. Read and follow all instructions on the label. · Breathe moist air from a humidifier, hot shower, or sink filled with hot water. The heat and moisture will thin mucus so you can cough it out. · Do not smoke. Smoking can make bronchitis worse. If you need help quitting, talk to your doctor about stop-smoking programs and medicines. These can increase your chances of quitting for good.   When should you call for help? Call 911 anytime you think you may need emergency care. For example, call if:  ? · You have severe trouble breathing. ?Call your doctor now or seek immediate medical care if:  ? · You have new or worse trouble breathing. ? · You cough up dark brown or bloody mucus (sputum). ? · You have a new or higher fever. ? · You have a new rash. ? Watch closely for changes in your health, and be sure to contact your doctor if:  ? · You cough more deeply or more often, especially if you notice more mucus or a change in the color of your mucus. ? · You are not getting better as expected. Where can you learn more? Go to http://lia-candace.info/. Enter H333 in the search box to learn more about \"Bronchitis: Care Instructions. \"  Current as of: May 12, 2017  Content Version: 11.4  © 4445-3698 [employer]. Care instructions adapted under license by [employer] (which disclaims liability or warranty for this information). If you have questions about a medical condition or this instruction, always ask your healthcare professional. [address] any warranty or liability for your use of this information.

## 2018-05-15 NOTE — PROGRESS NOTES
1. Have you been to the ER, urgent care clinic since your last visit? Hospitalized since your last visit? No    2. Have you seen or consulted any other health care providers outside of the [address] [place] [address] since your last visit? Include any pap smears or colon screening.  No

## 2018-05-15 NOTE — MR AVS SNAPSHOT
[facility] [address] 
[phone] Patient: Sharon Cano MRN: [id number] :1980 Visit Information Date & Time Provider Department Dept. Phone Encounter #  
 5/15/2018  4:00 PM Noelle Clark, [address] [place] [address] Road [id number] Follow-up Instructions Return annually for physicals. Upcoming Health Maintenance Date Due Influenza Age 5 to Adult 2018 DTaP/Tdap/Td series (2 - Td) 3/4/2019 Allergies as of 5/15/2018  Review Complete On: 2017 By: Noelle Clark MD  
 No Known Allergies Current Immunizations  Reviewed on 2014 Name Date Influenza Vaccine (Quad) PF 2017 Tdap 3/4/2009 Not reviewed this visit You Were Diagnosed With   
  
 Codes Comments Bronchitis    -  Primary ICD-10-CM: L43 ICD-9-CM: 907 Vitals BP Pulse Temp Resp Height(growth percentile) Weight(growth percentile)  
 118/64 (BP 1 Location: Left arm, BP Patient Position: Sitting) 72 98.2 °F (36.8 °C) (Oral) 16 5' 9\" (1.753 m) 172 lb (78 kg) SpO2 BMI Smoking Status 98% 25.4 kg/m2 Never Smoker Vitals History BMI and BSA Data Body Mass Index Body Surface Area  
 25.4 kg/m 2 1.95 m 2 Preferred Pharmacy Pharmacy Name Phone Jessica Chaidez [address], [address] [place] [address] [phone] Your Updated Medication List  
  
   
This list is accurate as of 5/15/18  4:37 PM.  Always use your most recent med list.  
  
  
  
  
 azithromycin 250 mg tablet Commonly known as:  Thierno Simone Use as directed  
  
 tadalafil 10 mg tablet Commonly known as:  CIALIS Take 1 Tab by mouth every seventy-two (72) hours as needed. Prescriptions Printed Refills  
 azithromycin (ZITHROMAX Z-MEERA) 250 mg tablet 0 Sig: Use as directed Class: Print Follow-up Instructions Return annually for physicals. Patient Instructions Bronchitis: Care Instructions Your Care Instructions Bronchitis is inflammation of the bronchial tubes, which carry air to the lungs. The tubes swell and produce mucus, or phlegm. The mucus and inflamed bronchial tubes make you cough. You may have trouble breathing. Most cases of bronchitis are caused by viruses like those that cause colds. Antibiotics usually do not help and they may be harmful. Bronchitis usually develops rapidly and lasts about 2 to 3 weeks in otherwise healthy people. Follow-up care is a key part of your treatment and safety. Be sure to make and go to all appointments, and call your doctor if you are having problems. It's also a good idea to know your test results and keep a list of the medicines you take. How can you care for yourself at home? · Take all medicines exactly as prescribed. Call your doctor if you think you are having a problem with your medicine. · Get some extra rest. 
· Take an over-the-counter pain medicine, such as acetaminophen (Tylenol), ibuprofen (Advil, Motrin), or naproxen (Aleve) to reduce fever and relieve body aches. Read and follow all instructions on the label. · Do not take two or more pain medicines at the same time unless the doctor told you to. Many pain medicines have acetaminophen, which is Tylenol. Too much acetaminophen (Tylenol) can be harmful. · Take an over-the-counter cough medicine that contains dextromethorphan to help quiet a dry, hacking cough so that you can sleep. Avoid cough medicines that have more than one active ingredient. Read and follow all instructions on the label. · Breathe moist air from a humidifier, hot shower, or sink filled with hot water. The heat and moisture will thin mucus so you can cough it out. · Do not smoke. Smoking can make bronchitis worse. If you need help quitting, talk to your doctor about stop-smoking programs and medicines. These can increase your chances of quitting for good. When should you call for help? Call 911 anytime you think you may need emergency care. For example, call if: 
? · You have severe trouble breathing. ?Call your doctor now or seek immediate medical care if: 
? · You have new or worse trouble breathing. ? · You cough up dark brown or bloody mucus (sputum). ? · You have a new or higher fever. ? · You have a new rash. ? Watch closely for changes in your health, and be sure to contact your doctor if: 
? · You cough more deeply or more often, especially if you notice more mucus or a change in the color of your mucus. ? · You are not getting better as expected. Where can you learn more? Go to http://lia-candace.info/. Enter H333 in the search box to learn more about \"Bronchitis: Care Instructions. \" Current as of: May 12, 2017 Content Version: 11.4 © 1497-2676 [employer]. Care instructions adapted under license by [employer] (which disclaims liability or warranty for this information). If you have questions about a medical condition or this instruction, always ask your healthcare professional. [place] [address] any warranty or liability for your use of this information. Introducing [facility]! Dear [place]: Thank you for requesting a [employer] account. Our records indicate that you already have an active [employer] account. You can access your account anytime at [email or web address] Did you know that you can access your hospital and ER discharge instructions at any time in [employer]? You can also review all of your test results from your hospital stay or ER visit. Additional Information If you have questions, please visit the Frequently Asked Questions section of the [employer] website at [email or web address] Remember, [employer] is NOT to be used for urgent needs.  For medical emergencies, dial 911. Now available from your iPhone and Android! Please provide this summary of care documentation to your next provider. Your primary care clinician is listed as Jose Fuentes. If you have any questions after today's visit, please call [phone].

## 2018-05-16 NOTE — PROGRESS NOTES
SUBJECTIVE  Chief Complaint   Patient presents with    Cough     persistent cough ongoing past 2 weeks     The patient presents complaining of a 2 week history of cough. At the onset, he felt like he was ill with a URI. That seemed to get better but his cough has lingered. The cough is described as worse in the mornings upon waking and sometimes productive. The patient does not have nasal congestion. The patient denies sinus pressure. The patient denies fevers. The patient denies shortness of breath, chest pain, chest tightness, or wheezing. The patient has tried various OTC cough and cold medications and has been on a daily allergy pill without significant relief. OBJECTIVE    Blood pressure 118/64, pulse 72, temperature 98.2 °F (36.8 °C), temperature source Oral, resp. rate 16, height 5' 9\" (1.753 m), weight 172 lb (78 kg), SpO2 98 %. General:  Alert, cooperative, well appearing, in no apparent distress. Eyes: The lids are without swelling, lesions, or drainage. The conjunctiva is clear and noninjected. ENT:  The septum is midline. The maxillary and frontal sinuses are nontender to palpation. The nasal turbinates are engorged, worse right side than left. He has allergic shiners bilaterally, right worse than left. The tympanic membranes and external auditory canal are normal bilaterally. The tongue and mucous membranes are pink and moist without lesions. The pharynx is non-erythematous without exudates. There is evidence of postnasal drainage with a cobblestoning pattern. Neck:  supple without adenopathy. CV:  The heart sounds are regular in rate and rhythm. There is a normal S1 and S2. There or no murmurs. Lungs: Inspiratory and expiratory efforts are full and unlabored. Lung sounds are clear and equal to auscultation throughout all lung fields without rhonchi, wheezing or rales. Skin:  No rashes or jaundice. Psych: normal affect. Mood good. Oriented x 3.   Judgement and insight intact. ASSESSMENT / PLAN    ICD-10-CM ICD-9-CM    1. Bronchitis J40 490      Continue OTC allergy pill. Consider Nyquil at night. I think this will resolved on its own. We discussed both post-viral bronchitis and allergic bronchitis as possibilities. Monitor symptoms to resolution. I provided him with a rx for a z-sasha should this become more productive or worsens (fevers, worsening cough). The patient was instructed to follow-up if their condition worsened or persisted. All chart history elements were reviewed by me at the time of the visit even though marked at time of note closure. Patient understands our medical plan. Patient has provided input and agrees with goals. Alternatives have been explained and offered. All questions answered. The patient is to call if condition worsens or fails to improve. Follow-up Disposition:  Return annually for physicals.

## 2018-11-21 ENCOUNTER — HOSPITAL ENCOUNTER (OUTPATIENT)
Dept: LAB | Age: 38
Discharge: HOME | End: 2018-11-21
Payer: COMMERCIAL

## 2018-11-21 ENCOUNTER — OFFICE VISIT (OUTPATIENT)
Dept: FAMILY MEDICINE CLINIC | Age: 38
End: 2018-11-21

## 2018-11-21 ENCOUNTER — HOSPITAL ENCOUNTER (OUTPATIENT)
Dept: GENERAL RADIOLOGY | Age: 38
Discharge: HOME | End: 2018-11-21
Payer: COMMERCIAL

## 2018-11-21 VITALS
DIASTOLIC BLOOD PRESSURE: 76 MMHG | OXYGEN SATURATION: 98 % | TEMPERATURE: 98 F | HEART RATE: 47 BPM | RESPIRATION RATE: 14 BRPM | SYSTOLIC BLOOD PRESSURE: 100 MMHG | HEIGHT: 69 IN | WEIGHT: 172 LBS | BODY MASS INDEX: 25.48 KG/M2

## 2018-11-21 DIAGNOSIS — I49.9 IRREGULAR HEART RATE: ICD-10-CM

## 2018-11-21 DIAGNOSIS — M72.2 PLANTAR FASCIITIS, LEFT: ICD-10-CM

## 2018-11-21 DIAGNOSIS — R94.31 ABNORMAL EKG: ICD-10-CM

## 2018-11-21 DIAGNOSIS — Z00.00 WELL ADULT EXAM: Primary | ICD-10-CM

## 2018-11-21 DIAGNOSIS — Z23 ENCOUNTER FOR IMMUNIZATION: ICD-10-CM

## 2018-11-21 DIAGNOSIS — Z11.3 SCREEN FOR STD (SEXUALLY TRANSMITTED DISEASE): ICD-10-CM

## 2018-11-21 LAB
HIV 1+2 AB+HIV1 P24 AG SERPL QL IA: NONREACTIVE
HIV12 RESULT COMMENT, HHIVC: NORMAL

## 2018-11-21 PROCEDURE — 73620 X-RAY EXAM OF FOOT: CPT

## 2018-11-21 PROCEDURE — 87491 CHLMYD TRACH DNA AMP PROBE: CPT

## 2018-11-21 PROCEDURE — 36415 COLL VENOUS BLD VENIPUNCTURE: CPT

## 2018-11-21 PROCEDURE — 87389 HIV-1 AG W/HIV-1&-2 AB AG IA: CPT

## 2018-11-21 PROCEDURE — 86780 TREPONEMA PALLIDUM: CPT

## 2018-11-21 NOTE — PATIENT INSTRUCTIONS
Well Visit, Ages 25 to 48: Care Instructions Your Care Instructions Physical exams can help you stay healthy. Your doctor has checked your overall health and may have suggested ways to take good care of yourself. He or she also may have recommended tests. At home, you can help prevent illness with healthy eating, regular exercise, and other steps. Follow-up care is a key part of your treatment and safety. Be sure to make and go to all appointments, and call your doctor if you are having problems. It's also a good idea to know your test results and keep a list of the medicines you take. How can you care for yourself at home? · Reach and stay at a healthy weight. This will lower your risk for many problems, such as obesity, diabetes, heart disease, and high blood pressure. · Get at least 30 minutes of physical activity on most days of the week. Walking is a good choice. You also may want to do other activities, such as running, swimming, cycling, or playing tennis or team sports. Discuss any changes in your exercise program with your doctor. · Do not smoke or allow others to smoke around you. If you need help quitting, talk to your doctor about stop-smoking programs and medicines. These can increase your chances of quitting for good. · Talk to your doctor about whether you have any risk factors for sexually transmitted infections (STIs). Having one sex partner (who does not have STIs and does not have sex with anyone else) is a good way to avoid these infections. · Use birth control if you do not want to have children at this time. Talk with your doctor about the choices available and what might be best for you. · Protect your skin from too much sun. When you're outdoors from 10 a.m. to 4 p.m., stay in the shade or cover up with clothing and a hat with a wide brim. Wear sunglasses that block UV rays. Even when it's cloudy, put broad-spectrum sunscreen (SPF 30 or higher) on any exposed skin. · See a dentist one or two times a year for checkups and to have your teeth cleaned. · Wear a seat belt in the car. · Drink alcohol in moderation, if at all. That means no more than 2 drinks a day for men and 1 drink a day for women. Follow your doctor's advice about when to have certain tests. These tests can spot problems early. For everyone · Cholesterol. Have the fat (cholesterol) in your blood tested after age 21. Your doctor will tell you how often to have this done based on your age, family history, or other things that can increase your risk for heart disease. · Blood pressure. Have your blood pressure checked during a routine doctor visit. Your doctor will tell you how often to check your blood pressure based on your age, your blood pressure results, and other factors. · Vision. Talk with your doctor about how often to have a glaucoma test. 
· Diabetes. Ask your doctor whether you should have tests for diabetes. · Colon cancer. Have a test for colon cancer at age 48. You may have one of several tests. If you are younger than 48, you may need a test earlier if you have any risk factors. Risk factors include whether you already had a precancerous polyp removed from your colon or whether your parent, brother, sister, or child has had colon cancer. For women · Breast exam and mammogram. Talk to your doctor about when you should have a clinical breast exam and a mammogram. Medical experts differ on whether and how often women under 50 should have these tests. Your doctor can help you decide what is right for you. · Pap test and pelvic exam. Begin Pap tests at age 24. A Pap test is the best way to find cervical cancer. The test often is part of a pelvic exam. Ask how often to have this test. 
· Tests for sexually transmitted infections (STIs). Ask whether you should have tests for STIs. You may be at risk if you have sex with more than one person, especially if your partners do not wear condoms. For men · Tests for sexually transmitted infections (STIs). Ask whether you should have tests for STIs. You may be at risk if you have sex with more than one person, especially if you do not wear a condom. · Testicular cancer exam. Ask your doctor whether you should check your testicles regularly. · Prostate exam. Talk to your doctor about whether you should have a blood test (called a PSA test) for prostate cancer. Experts differ on whether and when men should have this test. Some experts suggest it if you are older than 39 and are -American or have a father or brother who got prostate cancer when he was younger than 72. When should you call for help? Watch closely for changes in your health, and be sure to contact your doctor if you have any problems or symptoms that concern you. Where can you learn more? Go to http://lia-candace.info/. Enter P072 in the search box to learn more about \"Well Visit, Ages 25 to 48: Care Instructions. \" Current as of: March 29, 2018 Content Version: 11.8 © 0403-6811 [employer]. Care instructions adapted under license by [employer] (which disclaims liability or warranty for this information). If you have questions about a medical condition or this instruction, always ask your healthcare professional. [place] [address] any warranty or liability for your use of this information. Testicular Self-Exam: Care Instructions Your Care Instructions A self-exam is a way for you to check for cancer of the testicles. Although testicular cancer is rare, it is one of the most common tumors in men younger than 28. Many testicular cancers are found during self-exam. In the early stages of testicular cancer, the lump, which may be about the size of a pea, usually is not painful. Testicular cancer, especially if treated early, is very often cured. By doing this self-exam regularly, you can learn the normal size, shape, and weight of your testicles. This allows you to note any changes. Follow-up care is a key part of your treatment and safety. Be sure to make and go to all appointments, and call your doctor if you are having problems. It's also a good idea to know your test results and keep a list of the medicines you take. How can you care for yourself at home? · The exam is best done during or after a bath or showerwhen the scrotum, the skin sac that holds the testicles, is relaxed. · Stand and place your right leg on a raised surface about chair height. Then gently feel your scrotum until you find the right testicle. · Roll the testicle gently but firmly between your thumb and fingers of both hands. Carefully feel the surface for lumps. Feel for any change in the size, shape, or texture of the testicle. The testicle should feel round and smooth. It is normal for one testicle to be slightly larger than the other one. · Repeat this for the left side. Feel the entire surface of both testicles. · You may feel the epididymis, the soft tube behind each testicle. Become familiar with this structure so that you won't mistake it for a lump. When should you call for help? Call your doctor now or seek immediate medical care if: 
  · You have pain in a testicle.  
 Watch closely for changes in your health, and be sure to contact your doctor if: 
  · You notice a change in a testicle.  
  · You notice a lump in a testicle. Where can you learn more? Go to http://lia-candace.info/. Enter V471 in the search box to learn more about \"Testicular Self-Exam: Care Instructions. \" Current as of: December 3, 2017 Content Version: 11.8 © 0506-5178 Healthwise[employer].  Care instructions adapted under license by [employer] (which disclaims liability or warranty for this information). If you have questions about a medical condition or this instruction, always ask your healthcare professional. [address] any warranty or liability for your use of this information. Plantar Fasciitis: Care Instructions Your Care Instructions Plantar fasciitis is pain and inflammation of the plantar fascia, the tissue at the bottom of your foot that connects the heel bone to the toes. The plantar fascia also supports the arch. If you strain the plantar fascia, it can develop small tears and cause heel pain when you stand or walk. Plantar fasciitis can be caused by running or other sports. It also may occur in people who are overweight or who have high arches or flat feet. You may get plantar fasciitis if you walk or stand for long periods, or have a tight Achilles tendon or calf muscles. You can improve your foot pain with rest and other care at home. It might take a few weeks to a few months for your foot to heal completely. Follow-up care is a key part of your treatment and safety. Be sure to make and go to all appointments, and call your doctor if you are having problems. It's also a good idea to know your test results and keep a list of the medicines you take. How can you care for yourself at home? · Rest your feet often. Reduce your activity to a level that lets you avoid pain. If possible, do not run or walk on hard surfaces. · Take pain medicines exactly as directed. ? If the doctor gave you a prescription medicine for pain, take it as prescribed. ? If you are not taking a prescription pain medicine, take an over-the-counter anti-inflammatory medicine for pain and swelling, such as ibuprofen (Advil, Motrin) or naproxen (Aleve). Read and follow all instructions on the label. · Use ice massage to help with pain and swelling. You can use an ice cube or an ice cup several times a day.  To make an ice cup, fill a paper cup with water and freeze it. Cut off the top of the cup until a half-inch of ice shows. Hold onto the remaining paper to use the cup. Rub the ice in small circles over the area for 5 to 7 minutes. · Contrast baths, which alternate hot and cold water, can also help reduce swelling. But because heat alone may make pain and swelling worse, end a contrast bath with a soak in cold water. · Wear a night splint if your doctor suggests it. A night splint holds your foot with the toes pointed up and the foot and ankle at a 90-degree angle. This position gives the bottom of your foot a constant, gentle stretch. · Do simple exercises such as calf stretches and towel stretches 2 to 3 times each day, especially when you first get up in the morning. These can help the plantar fascia become more flexible. They also make the muscles that support your arch stronger. Hold these stretches for 15 to 30 seconds per stretch. Repeat 2 to 4 times. ? Stand about 1 foot from a wall. Place the palms of both hands against the wall at chest level. Lean forward against the wall, keeping one leg with the knee straight and heel on the ground while bending the knee of the other leg. 
? Sit down on the floor or a mat with your feet stretched in front of you. Roll up a towel lengthwise, and loop it over the ball of your foot. Holding the towel at both ends, gently pull the towel toward you to stretch your foot. · Wear shoes with good arch support. Athletic shoes or shoes with a well-cushioned sole are good choices. · Try heel cups or shoe inserts (orthotics) to help cushion your heel. You can buy these at many shoe stores. · Put on your shoes as soon as you get out of bed. Going barefoot or wearing slippers may make your pain worse. · Reach and stay at a good weight for your height. This puts less strain on your feet. When should you call for help? Call your doctor now or seek immediate medical care if:   · You have heel pain with fever, redness, or warmth in your heel.  
  · You cannot put weight on the sore foot.  
 Watch closely for changes in your health, and be sure to contact your doctor if: 
  · You have numbness or tingling in your heel.  
  · Your heel pain lasts more than 2 weeks. Where can you learn more? Go to http://lia-candace.info/. Becky Gardiner in the search box to learn more about \"Plantar Fasciitis: Care Instructions. \" Current as of: November 29, 2017 Content Version: 11.8 © 6205-5428 [employer]. Care instructions adapted under license by [employer] (which disclaims liability or warranty for this information). If you have questions about a medical condition or this instruction, always ask your healthcare professional. [address] any warranty or liability for your use of this information. Plantar Fasciitis: Exercises Your Care Instructions Here are some examples of typical rehabilitation exercises for your condition. Start each exercise slowly. Ease off the exercise if you start to have pain. Your doctor or physical therapist will tell you when you can start these exercises and which ones will work best for you. How to do the exercises Towel stretch 1. Sit with your legs extended and knees straight. 2. Place a towel around your foot just under the toes. 3. Hold each end of the towel in each hand, with your hands above your knees. 4. Pull back with the towel so that your foot stretches toward you. 5. Hold the position for at least 15 to 30 seconds. 6. Repeat 2 to 4 times a session, up to 5 sessions a day. Calf stretch 1. Stand facing a wall with your hands on the wall at about eye level. Put the leg you want to stretch about a step behind your other leg. 2. Keeping your back heel on the floor, bend your front knee until you feel a stretch in the back leg. 3. Hold the stretch for 15 to 30 seconds. Repeat 2 to 4 times. Plantar fascia and calf stretch 1. Stand on a step as shown above. Be sure to hold on to the banister. 2. Slowly let your heels down over the edge of the step as you relax your calf muscles. You should feel a gentle stretch across the bottom of your foot and up the back of your leg to your knee. 3. Hold the stretch about 15 to 30 seconds, and then tighten your calf muscle a little to bring your heel back up to the level of the step. Repeat 2 to 4 times. Towel curls 1. While sitting, place your foot on a towel on the floor and scrunch the towel toward you with your toes. 2. Then, also using your toes, push the towel away from you. Seaside pickups 1. Put marbles on the floor next to a cup. 
2. Using your toes, try to lift the marbles up from the floor and put them in the cup. Follow-up care is a key part of your treatment and safety. Be sure to make and go to all appointments, and call your doctor if you are having problems. It's also a good idea to know your test results and keep a list of the medicines you take. Where can you learn more? Go to http://lia-candace.info/. Valarie Grossman in the search box to learn more about \"Plantar Fasciitis: Exercises. \" Current as of: November 29, 2017 Content Version: 11.8 © 2416-5781 Healthwise, [facility]. Care instructions adapted under license by [employer] (which disclaims liability or warranty for this information). If you have questions about a medical condition or this instruction, always ask your healthcare professional. [place] [address] any warranty or liability for your use of this information.

## 2018-11-21 NOTE — PROGRESS NOTES
Chief Complaint Patient presents with  Physical  
 Foot Pain  
  left; denies injury; pain is worsening Subjective:  
Cortney Burleson is a 43 y.o. male presenting for his annual checkup. ROS:  Feeling well. Throat fullness has resolved from last year. That helped with treatment of allergic rhinitis as needed. No dyspnea or chest pain on exertion. Sometimes if he is pushing the limits with exercise he gets a bit lightheaded. Finished a spartan race this weekend without issues. Works out crossfit 3-4 days per week. No abdominal pain, change in bowel habits, black or bloody stools. No urinary tract symptoms. No neurological complaints. Has not had as much daytime sleepiness but does sometimes wake up gasping for air. Pain in arches of feet during jumping jacks has progressed to pain upon waking. Specific concerns today:  
See ROS. No Known Allergies History reviewed. No pertinent past medical history. Past Surgical History:  
Procedure Laterality Date  HX WISDOM TEETH EXTRACTION Family History Problem Relation Age of Onset  Diabetes Mother  Heart Disease Father CABG in his 62s Social History Tobacco Use  Smoking status: Never Smoker  Smokeless tobacco: Never Used  Tobacco comment: Hookah axp 1 a month Substance Use Topics  Alcohol use: Yes Frequency: 2-4 times a month Drinks per session: 1 or 2 Binge frequency: Never Comment: Occasionally Objective:  
 
Visit Vitals /76 (BP 1 Location: Left arm, BP Patient Position: Sitting) Pulse (!) 47 Temp 98 °F (36.7 °C) (Oral) Resp 14 Ht 5' 9\" (1.753 m) Wt 172 lb (78 kg) SpO2 98% BMI 25.40 kg/m² The patient appears well, alert, oriented x 3, in no distress. ENT normal with the exception of mild nasal turbinates being engorged and posterior pharyngeal cobblestoning. Neck supple.  No adenopathy or thyromegaly. ARTHUR. Lungs are clear, good air entry, no wheezes, rhonchi or rales. S1 and S2 normal, no murmurs, regular rate but abnormal rhythm. Abdomen is soft without tenderness, guarding, mass or organomegaly.  exam: self testicular handout. Feet:  Bilateral pes planus (mild). Tenderness of the left plantar fascia insertion. Extremities show no edema, normal peripheral pulses. Neurological is normal without focal findings. Psych: normal affect. Mood good. Oriented x 3. Judgement and insight intact. EKG: there are no previous tracings available for comparison, sinus rhythm. Frequent PAC's noted. Negative precordial T-waves  -May be normal -consider anteroseptal ischemia. Assessment/Plan: ICD-10-CM ICD-9-CM 1. Well adult exam Z00.00 V70.0 2. Encounter for immunization Z23 V03.89 INFLUENZA VIRUS VAC QUAD,SPLIT,PRESV FREE SYRINGE IM  
   DC IMMUNIZ ADMIN,1 SINGLE/COMB VAC/TOXOID 3. Screen for STD (sexually transmitted disease) Z11.3 V74.5 HIV 1/2 AG/AB, 4TH GENERATION,W RFLX CONFIRM  
   RPR  
   CHLAMYDIA/GC PCR  
4. Irregular heart rate I49.9 427.9 AMB POC EKG ROUTINE W/ 12 LEADS, INTER & REP 5. Plantar fasciitis, left M72.2 728.71 XR FOOT LT AP/LAT 6. Abnormal EKG R94.31 794.31 REFERRAL TO CARDIOLOGY Age and sex specific counseling. Advised on plantar fascia exercises. X-rays of the left foot. STD testing requested. Has had a few recent partners but no concerns for exposure or symptoms. Just wants screening. Flu vaccine administered. Referred to cardiology for abnormal rhythm. Activity as tolerated. He feels like this has been a chronic issue. All chart history elements were reviewed by me at the time of the visit even though marked at time of note closure. Patient understands our medical plan. Patient has provided input and agrees with goals. Alternatives have been explained and offered. All questions answered. The patient is to call if condition worsens or fails to improve.   
 
Follow-up Disposition: 
Return in about 1 year (around 11/21/2019) for physical.

## 2018-11-21 NOTE — PROGRESS NOTES
Chief Complaint Patient presents with  Physical  
 Foot Pain  
  left; denies injury; pain is worsening Physician order obtained. Patient completed adult immunization consent form. Allergies, contraindications and recommendations reviewed with patient. Seasonal influenza vaccine administered IM left deltoid. Patient tolerated well. Patient remained in office for 15 minutes after injection and no adverse reactions were noted. 1. Have you been to the ER, urgent care clinic since your last visit? Hospitalized since your last visit? No 
 
2. Have you seen or consulted any other health care providers outside of the [address] [place] [address] since your last visit? Include any pap smears or colon screening.  Yes Where: dermatology [facility]

## 2018-11-23 LAB
C TRACH RRNA SPEC QL NAA+PROBE: NEGATIVE
N GONORRHOEA RRNA SPEC QL NAA+PROBE: NEGATIVE
SPECIMEN SOURCE: NORMAL
T PALLIDUM AB SER QL IA: NONREACTIVE

## 2018-11-23 NOTE — PROGRESS NOTES
Nurse to advise that his x-rays were normal.   The diagnosis we discussed is most likely - plantar fasciitis.

## 2018-11-26 NOTE — PROGRESS NOTES
Nurse to advise testing was negative. X-rays were normal as well (you may have already called about the x-rays).

## 2019-05-22 ENCOUNTER — OFFICE VISIT (OUTPATIENT)
Dept: CARDIOLOGY CLINIC | Age: 39
End: 2019-05-22

## 2019-05-22 VITALS
HEIGHT: 69 IN | WEIGHT: 175 LBS | BODY MASS INDEX: 25.92 KG/M2 | OXYGEN SATURATION: 98 % | SYSTOLIC BLOOD PRESSURE: 112 MMHG | HEART RATE: 50 BPM | DIASTOLIC BLOOD PRESSURE: 78 MMHG

## 2019-05-22 DIAGNOSIS — G47.30 SLEEP APNEA, UNSPECIFIED TYPE: ICD-10-CM

## 2019-05-22 DIAGNOSIS — R00.2 PALPITATIONS: ICD-10-CM

## 2019-05-22 DIAGNOSIS — R07.9 CHEST PAIN, UNSPECIFIED TYPE: Primary | ICD-10-CM

## 2019-05-22 NOTE — PROGRESS NOTES
[address] [place] [address]    Chief Complaint   Patient presents with    New Patient     referred by Melissa Heredia for abnormal EKG - no cardiac complaints       HPI    Akin Tavera is a 35 y.o. [place] gentleman, with no known heart disease here for evaluation of palpitations and chest discomfort. As you know during a routine well check at your office patient was noted to have an irregular heart rate. I independently obtained and reviewed your office note and I discussed this encounter with the patient. He admits he was taking quite a bit of the counter supplements, energy drinks you advised him to go for a sleep study. He admits to not following through on the things you asked him to. It sounds like he did well for quite some time until very recently when he was out with his friend and started to experience sudden onset chest discomfort and palpitations. His friend who is in the medical field advised him to go to the ER for further evaluation. I personally obtained these records and reviewed with patient as well. His work-up there included blood work which I personally reviewed and notes normal kidney function as well as normal potassium level. His troponins were negative. EKG showed sinus rhythm with ventricular bigeminy. This was a report that I was able to review. Also they mentioned that his rhythm interpretation from the monitor showed frequent premature atrial beats. 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 He works out at least 4 days a week in some type of high intensity CrossFit type setting. Does take several over-the-counter supplements which include creatine. He admits to also taking BC powders as needed when his muscles are sore. His history was reviewed below. He has no chronic medical problems except for seasonal allergies. He takes no daily medications. His family history was extensively reviewed.   It does sound like it is positive for premature coronary disease his father had bypass in his early 62s. Cardiac RFs: +FH premature CAD in his father      Past Surgical History:   Procedure Laterality Date    HX WISDOM TEETH EXTRACTION       No Known Allergies    Social History     Socioeconomic History    Marital status: SINGLE     Spouse name: Not on file    Number of children: Not on file    Years of education: Not on file    Highest education level: Not on file   Occupational History    Occupation: IT Field Service   Social Needs    Financial resource strain: Not on file    Food insecurity:     Worry: Not on file     Inability: Not on file    Transportation needs:     Medical: Not on file     Non-medical: Not on file   Tobacco Use    Smoking status: Never Smoker    Smokeless tobacco: Never Used    Tobacco comment: Hookah axp 1 a month   Substance and Sexual Activity    Alcohol use: Yes     Frequency: 2-4 times a month     Drinks per session: 1 or 2     Binge frequency: Never     Comment: Occasionally    Drug use: No    Sexual activity: Yes     Partners: Female   Lifestyle    Physical activity:     Days per week: Not on file     Minutes per session: Not on file    Stress: Not on file   Relationships    Social connections:     Talks on phone: Not on file     Gets together: Not on file     Attends Taoist service: Not on file     Active member of club or organization: Not on file     Attends meetings of clubs or organizations: Not on file     Relationship status: Not on file    Intimate partner violence:     Fear of current or ex partner: Not on file     Emotionally abused: Not on file     Physically abused: Not on file     Forced sexual activity: Not on file   Other Topics Concern    Not on file   Social History Narrative    Not on file        Review of Systems    14 pt Review of Systems is negative unless otherwise mentioned in the HPI.     Wt Readings from Last 3 Encounters:   05/22/19 79.4 kg (175 lb)   11/21/18 78 kg (172 lb)   05/15/18 78 kg (172 lb)     Temp Readings from Last 3 Encounters:   11/21/18 98 °F (36.7 °C) (Oral)   05/15/18 98.2 °F (36.8 °C) (Oral)   11/22/17 98 °F (36.7 °C) (Oral)     BP Readings from Last 3 Encounters:   05/22/19 112/78   11/21/18 100/76   05/15/18 118/64     Pulse Readings from Last 3 Encounters:   05/22/19 (!) 50   11/21/18 (!) 47   05/15/18 72     Physical Exam:    Visit Vitals  /78   Pulse (!) 50   Ht 5' 9\" (1.753 m)   Wt 79.4 kg (175 lb)   SpO2 98%   BMI 25.84 kg/m²      Physical Exam   Constitutional: He is oriented to person, place, and time. He appears well-developed and well-nourished. HENT:   Head: Normocephalic and atraumatic. Eyes: Pupils are equal, round, and reactive to light. EOM are normal. No scleral icterus. Neck: No JVD present. Cardiovascular: Normal rate, regular rhythm, normal heart sounds and intact distal pulses. Exam reveals no gallop and no friction rub. No murmur heard. Pulmonary/Chest: Effort normal and breath sounds normal. No respiratory distress. He has no wheezes. He has no rales. He exhibits no tenderness. Abdominal: Soft. Bowel sounds are normal.   Musculoskeletal: He exhibits no edema. Neurological: He is alert and oriented to person, place, and time. Skin: Skin is warm and dry. No rash noted. Psychiatric: He has a normal mood and affect. EKGs:  Last ekg in the system (2018) shows what looks like NSR with PACs.  There is a more recent ekg from [facility] [employer] that shows [address]    Impression and Plan:  Eagle Griffin is a 35 y.o. with:    1.) Sporadic palpations, assoc with atypical CP  2.) H/o PACs and Wenckenbach (Mobitz I), noted  3.) Energy drinks, caffeine, NSAIDs all being used quite regularly  4.) Signs and symptoms suggestive of sleep disordered breathing  5.) Resting bradycardia, likely high vagal tone/ normal variant in young athlete    1.) Echocardiogram  2.) Sent another referral for sleep study  3.) Discussed avoiding stimulants listed in #3  4.) If continues to have symptoms despite sleep study, backing off stimulants we can get a heart monitor but I would not recommend AV vinay blockade in this active gentleman (as this will interfere in his ability to achieve target heart rate etc), if his LV function is normal we should focus on what is triggering the ectopy to reduce his symptoms. Significant time spent discussing the above plan, which he was comfortable with. We will plan to call him with the results of the echocardiogram. If negative, I'm hoping lifestyle changes will help reduce his palpitations. All questions answered, please call sooner if questions/ problems. Thank you for allowing me to participate in the care of your patient, please do not hesitate to call with questions or concerns.     [address],    Tova Barone, DO

## 2019-05-22 NOTE — PROGRESS NOTES
Brigette Guan presents today for   Chief Complaint   Patient presents with    New Patient     referred by Melissa Heredia for abnormal EKG        Mikey Julian preferred language for health care discussion is english/other. Is someone accompanying this pt? no    Is the patient using any DME equipment during [address]? no    Depression Screening:  3 most recent PHQ Screens 11/21/2018   Little interest or pleasure in doing things Not at all   Feeling down, depressed, irritable, or hopeless Not at all   Total Score PHQ 2 0       Learning Assessment:  Learning Assessment 11/21/2018   PRIMARY LEARNER Patient   HIGHEST LEVEL OF EDUCATION - PRIMARY LEARNER  2 YEARS [facility] PRIMARY LEARNER NONE   CO-LEARNER CAREGIVER No   PRIMARY LANGUAGE ENGLISH   LEARNER PREFERENCE PRIMARY DEMONSTRATION     VIDEOS     -   ANSWERED BY patient   RELATIONSHIP SELF       Abuse Screening:  Abuse Screening Questionnaire 11/21/2018   Do you ever feel afraid of your partner? N   Are you in a relationship with someone who physically or mentally threatens you? N   Is it safe for you to go home? Y       Fall Risk  No flowsheet data found. Pt currently taking Anticoagulant therapy? no    Coordination of Care:  1. Have you been to the ER, urgent care clinic since your last visit? Hospitalized since your last visit? no    2. Have you seen or consulted any other health care providers outside of the [address] [place] [address] since your last visit? Include any pap smears or colon screening.  no

## 2019-05-23 ENCOUNTER — TELEPHONE (OUTPATIENT)
Dept: CARDIOLOGY CLINIC | Age: 39
End: 2019-05-23

## 2019-05-23 NOTE — TELEPHONE ENCOUNTER
Called Dr. Junior Gail Mott's Office at   [facility]. [place] [address]  [phone]    They are scheduling sleep studies out to   August.  Verified with nurse if pt could wait that long and she stated that was normal.    I gave a copy of the referral to the patient and the office stated they will call the patient directly to schedule.   Andrea Oliver

## 2019-06-05 ENCOUNTER — HOSPITAL ENCOUNTER (OUTPATIENT)
Dept: NON INVASIVE DIAGNOSTICS | Age: 39
Discharge: HOME | End: 2019-06-05
Attending: INTERNAL MEDICINE
Payer: COMMERCIAL

## 2019-06-05 VITALS
HEIGHT: 69 IN | SYSTOLIC BLOOD PRESSURE: 112 MMHG | BODY MASS INDEX: 25.92 KG/M2 | WEIGHT: 175 LBS | DIASTOLIC BLOOD PRESSURE: 78 MMHG

## 2019-06-05 DIAGNOSIS — R07.9 CHEST PAIN, UNSPECIFIED TYPE: ICD-10-CM

## 2019-06-05 DIAGNOSIS — R00.2 PALPITATIONS: ICD-10-CM

## 2019-06-05 LAB
ECHO AO ROOT DIAM: 3.98 CM
ECHO LA AREA 4C: 16.5 CM2
ECHO LA VOL 2C: 75.43 ML (ref 18–58)
ECHO LA VOL 4C: 34.49 ML (ref 18–58)
ECHO LA VOL BP: 57.36 ML (ref 18–58)
ECHO LA VOL/BSA BIPLANE: 29.39 ML/M2 (ref 16–28)
ECHO LA VOLUME INDEX A2C: 38.65 ML/M2 (ref 16–28)
ECHO LA VOLUME INDEX A4C: 17.67 ML/M2 (ref 16–28)
ECHO LV INTERNAL DIMENSION DIASTOLIC: 4.79 CM (ref 4.2–5.9)
ECHO LV INTERNAL DIMENSION SYSTOLIC: 3.3 CM
ECHO LV IVSD: 0.96 CM (ref 0.6–1)
ECHO LV MASS 2D: 202.8 G (ref 88–224)
ECHO LV MASS INDEX 2D: 103.9 G/M2 (ref 49–115)
ECHO LV POSTERIOR WALL DIASTOLIC: 1.08 CM (ref 0.6–1)
ECHO LVOT DIAM: 2.34 CM
ECHO RV INTERNAL DIMENSION: 3.5 CM

## 2019-06-05 PROCEDURE — 93306 TTE W/DOPPLER COMPLETE: CPT

## 2019-06-07 NOTE — PROGRESS NOTES
Per your last note'  Impression and Plan:  Georgia Florez is a 35 y.o. with:     1.) Sporadic palpations, assoc with atypical CP  2.) H/o PACs and Wenckenbach (Blossom BRICENO), noted  3.) Energy drinks, caffeine, NSAIDs all being used quite regularly  4.) Signs and symptoms suggestive of sleep disordered breathing  5.) Resting bradycardia, likely high vagal tone/ normal variant in young athlete     1.) Echocardiogram  2.) Sent another referral for sleep study  3.) Discussed avoiding stimulants listed in #3  4.) If continues to have symptoms despite sleep study, backing off stimulants we can get a heart monitor but I would not recommend AV vinay blockade in this active gentleman (as this will interfere in his ability to achieve target heart rate etc), if his LV function is normal we should focus on what is triggering the ectopy to reduce his symptoms.     Significant time spent discussing the above plan, which he was comfortable with. We will plan to call him with the results of the echocardiogram. If negative, I'm hoping lifestyle changes will help reduce his palpitations.  All questions answered, please call sooner if questions/ problems.     Thank you for allowing me to participate in the care of your patient, please do not hesitate to call with questions or concerns.     Regards,     Walter Zurita, DO

## 2019-06-13 ENCOUNTER — TELEPHONE (OUTPATIENT)
Dept: CARDIOLOGY CLINIC | Age: 39
End: 2019-06-13

## 2019-06-13 NOTE — TELEPHONE ENCOUNTER
----- Message from Tova Barone DO sent at 6/13/2019  8:39 AM EDT -----  Can you please call pt and first tell him i'm sorry I didn't respond to his MyChart msg. Let him know I was out of town at a conference. His echo results are normal- which is further reassuring. I hope he is feeling better and look forward to hear how the sleep study turns out for him. Thanks    ----- Message -----  From: Lady Shirlene LPN  Sent: 1/2/6067  12:09 PM  To: Tova Barone DO    Per your last note'  Impression and Plan:  Emmy Claudio is a 35 y.o. with:     1.) Sporadic palpations, assoc with atypical CP  2.) H/o PACs and Wenckenbach (Mobitz I), noted  3.) Energy drinks, caffeine, NSAIDs all being used quite regularly  4.) Signs and symptoms suggestive of sleep disordered breathing  5.) Resting bradycardia, likely high vagal tone/ normal variant in young athlete     1.) Echocardiogram  2.) Sent another referral for sleep study  3.) Discussed avoiding stimulants listed in #3  4.) If continues to have symptoms despite sleep study, backing off stimulants we can get a heart monitor but I would not recommend AV vinay blockade in this active gentleman (as this will interfere in his ability to achieve target heart rate etc), if his LV function is normal we should focus on what is triggering the ectopy to reduce his symptoms.     Significant time spent discussing the above plan, which he was comfortable with. We will plan to call him with the results of the echocardiogram. If negative, I'm hoping lifestyle changes will help reduce his palpitations.  All questions answered, please call sooner if questions/ problems.     Thank you for allowing me to participate in the care of your patient, please do not hesitate to call with questions or concerns.     Regards,     Tova Barone DO

## 2019-06-14 NOTE — TELEPHONE ENCOUNTER
Called patient, verified by two identifiers, name and . Patient notified of echo results. All questions answered at time of phone call.

## 2019-07-18 ENCOUNTER — OFFICE VISIT (OUTPATIENT)
Dept: PULMONOLOGY | Age: 39
End: 2019-07-18

## 2019-07-18 VITALS
WEIGHT: 175 LBS | DIASTOLIC BLOOD PRESSURE: 60 MMHG | HEIGHT: 69 IN | TEMPERATURE: 97.5 F | OXYGEN SATURATION: 98 % | BODY MASS INDEX: 25.92 KG/M2 | SYSTOLIC BLOOD PRESSURE: 106 MMHG | RESPIRATION RATE: 16 BRPM | HEART RATE: 65 BPM

## 2019-07-18 DIAGNOSIS — G47.8 SLEEP PARALYSIS: ICD-10-CM

## 2019-07-18 DIAGNOSIS — I44.1 ATRIOVENTRICULAR BLOCK, MOBITZ TYPE 1, WENCKEBACH: ICD-10-CM

## 2019-07-18 DIAGNOSIS — Z78.9 EXCESSIVE CAFFEINE INTAKE: ICD-10-CM

## 2019-07-18 DIAGNOSIS — G47.10 HYPERSOMNIA: Primary | ICD-10-CM

## 2019-07-18 DIAGNOSIS — R06.83 SNORING: ICD-10-CM

## 2019-07-18 DIAGNOSIS — R44.2 HYPNAGOGIC HALLUCINATIONS: ICD-10-CM

## 2019-07-18 DIAGNOSIS — R06.89 GASPING FOR BREATH: ICD-10-CM

## 2019-07-18 NOTE — LETTER
7/18/19 Patient: Toni Guallpa [facility]. YOB: 1980 Date of Visit: 7/18/2019 Sofia Xavier MD 
[address] [id number] [address] [address] VIA In Basket Master Wright, Nate Elaine [phone] [address] [address] VIA In Basket Dear MD Master Pedraza DO, Thank you for referring Mr. Velasquez Harkins to [address] [place] [address] for evaluation. My notes for this consultation are attached. If you have questions, please do not hesitate to call me. I look forward to following your patient along with you.  
 
 
Sincerely, 
 
Brenda Arreaga, DO

## 2019-07-18 NOTE — PROGRESS NOTES
Fermín Bailey. presents today for   Chief Complaint   Patient presents with    Sleep Problem       Is someone accompanying this pt? No    Is the patient using any DME equipment during OV? No    -DME Company None    Depression Screening:  3 most recent PHQ Screens 5/22/2019   Little interest or pleasure in doing things Not at all   Feeling down, depressed, irritable, or hopeless Not at all   Total Score PHQ 2 0       Learning Assessment:  Learning Assessment 5/22/2019   PRIMARY LEARNER Patient   HIGHEST LEVEL OF EDUCATION - PRIMARY LEARNER  2 Miguel LEARNER -   CO-LEARNER CAREGIVER No   PRIMARY LANGUAGE ENGLISH   LEARNER PREFERENCE PRIMARY READING     -     -   ANSWERED BY patient   RELATIONSHIP SELF       Abuse Screening:  Abuse Screening Questionnaire 11/21/2018   Do you ever feel afraid of your partner? N   Are you in a relationship with someone who physically or mentally threatens you? N   Is it safe for you to go home? Y       Fall Risk  No flowsheet data found. Coordination of Care:  1. Have you been to the ER, urgent care clinic since your last visit? Hospitalized since your last visit? Yes; [facility]  5/20/19  2. Have you seen or consulted any other health care providers outside of the [address] [place] [address] since your last visit? Include any pap smears or colon screening.  No
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
## 2019-09-06 DIAGNOSIS — G47.10 HYPERSOMNIA: Primary | ICD-10-CM

## 2019-09-16 ENCOUNTER — HOSPITAL ENCOUNTER (OUTPATIENT)
Dept: SLEEP MEDICINE | Age: 39
Discharge: HOME | End: 2019-09-16
Payer: COMMERCIAL

## 2019-09-16 DIAGNOSIS — G47.10 HYPERSOMNIA: ICD-10-CM

## 2019-09-16 PROCEDURE — 95810 POLYSOM 6/> YRS 4/> PARAM: CPT

## 2019-09-16 PROCEDURE — 95805 MULTIPLE SLEEP LATENCY TEST: CPT

## 2019-09-16 PROCEDURE — 80307 DRUG TEST PRSMV CHEM ANLYZR: CPT

## 2019-09-17 ENCOUNTER — HOSPITAL ENCOUNTER (OUTPATIENT)
Dept: SLEEP MEDICINE | Age: 39
Discharge: HOME | End: 2019-09-17
Payer: COMMERCIAL

## 2019-09-17 VITALS — HEART RATE: 56 BPM | SYSTOLIC BLOOD PRESSURE: 116 MMHG | DIASTOLIC BLOOD PRESSURE: 51 MMHG

## 2019-09-17 DIAGNOSIS — G47.8 SLEEP PARALYSIS: ICD-10-CM

## 2019-09-17 DIAGNOSIS — R44.2 HYPNAGOGIC HALLUCINATIONS: ICD-10-CM

## 2019-09-17 DIAGNOSIS — R06.83 SNORING: ICD-10-CM

## 2019-09-17 DIAGNOSIS — G47.10 HYPERSOMNIA: ICD-10-CM

## 2019-09-17 DIAGNOSIS — G47.31 COMPLEX SLEEP APNEA SYNDROME: Primary | ICD-10-CM

## 2019-09-17 DIAGNOSIS — R06.89 GASPING FOR BREATH: ICD-10-CM

## 2019-09-17 LAB
AMPHET UR QL SCN: NEGATIVE
BARBITURATES UR QL SCN: NEGATIVE
BENZODIAZ UR QL: NEGATIVE
CANNABINOIDS UR QL SCN: NEGATIVE
COCAINE UR QL SCN: NEGATIVE
HDSCOM,HDSCOM: NORMAL
METHADONE UR QL: NEGATIVE
OPIATES UR QL: NEGATIVE
PCP UR QL: NEGATIVE

## 2019-09-17 PROCEDURE — 80307 DRUG TEST PRSMV CHEM ANLYZR: CPT

## 2019-09-17 PROCEDURE — 95805 MULTIPLE SLEEP LATENCY TEST: CPT

## 2019-09-17 NOTE — PROGRESS NOTES
The patient underwent sleep testing on 9/16/19. Recommendations listed below. Please refer to full report for specific details. Overall, the patient appeared to tolerate study well. Mild complex sleep apnea was noted during with study. Two cycles of REM sleep were recorded, both of which were in the lateral recumbent position. Etiology and/or clinical relevance of central apnea component unclear at this time. No contributory medications noted. This     <U>DIAGNOSIS:  1) Complex Sleep Apnea (CSA) AHI: 8.5. YADIEL: 3    RECOMMENDATIONS:      Have patient return for dedicated PAP titration and to monitor for further central apnea events.  Other non-invasive treatment options are recommended were applicable and include the following: weight reduction, smoking cessation, body position training, and modification of alcohol ingestion and/or sedating agents.  If these treatments are not possible or effective, an oral appliance may be an alternative and/or adjuvant non-invasive treatment.  If non-invasive treatments are not possible or effective, consideration may be given to possible corrective surgical options.  Healthy sleep habits are encouraged.  Individuals are encouraged to obtain 7-9 hours of sleep per day.   safety is encouraged. Drowsy and/or inattentive driving should be avoided.  Follow up with provider as directed.       Matilda Kim DO, [facility]    [facility]  Pulmonary, Critical Care, and Sleep Medicine

## 2019-09-23 ENCOUNTER — TELEPHONE (OUTPATIENT)
Dept: PULMONOLOGY | Age: 39
End: 2019-09-23

## 2019-09-23 NOTE — TELEPHONE ENCOUNTER
Pt states he would like results of his sleep study, because sleep lab has already told him he needs a titration done. Please call [phone].

## 2019-09-24 ENCOUNTER — TELEPHONE (OUTPATIENT)
Dept: PULMONOLOGY | Age: 39
End: 2019-09-24

## 2019-10-04 ENCOUNTER — TELEPHONE (OUTPATIENT)
Dept: PULMONOLOGY | Age: 39
End: 2019-10-04

## 2019-10-04 NOTE — TELEPHONE ENCOUNTER
From: Thu Templeton. Sent: 10/3/2019  12:20 PM EDT   To: [id number] Admin Pool   Subject: Test Results Question                             Joycelyn Barba,     I have a couple of questions in regards to my sleep study test. I was told that I have mild sleep apnea. What could have caused this? Is there a way of correcting it or is there any preventative measures I can take? Also, you mentioned that you wanted to test if I had some form of Narcolepsy. What was the results of that? Thank you.      Kind regards,   [address]

## 2019-11-14 ENCOUNTER — HOSPITAL ENCOUNTER (OUTPATIENT)
Dept: SLEEP MEDICINE | Age: 39
Discharge: HOME | End: 2019-11-14
Attending: INTERNAL MEDICINE
Payer: COMMERCIAL

## 2019-11-14 DIAGNOSIS — G47.31 COMPLEX SLEEP APNEA SYNDROME: ICD-10-CM

## 2019-11-14 PROCEDURE — 95811 POLYSOM 6/>YRS CPAP 4/> PARM: CPT

## 2019-11-15 VITALS
WEIGHT: 170.4 LBS | HEART RATE: 60 BPM | BODY MASS INDEX: 25.24 KG/M2 | DIASTOLIC BLOOD PRESSURE: 58 MMHG | HEIGHT: 69 IN | SYSTOLIC BLOOD PRESSURE: 104 MMHG

## 2019-11-18 ENCOUNTER — TELEPHONE (OUTPATIENT)
Dept: PULMONOLOGY | Age: 39
End: 2019-11-18

## 2019-11-21 NOTE — TELEPHONE ENCOUNTER
Called patient, no answer, left message requesting return call. Dr. Shan Ferrell has not ordered a CPAP machine yet.

## 2019-11-25 DIAGNOSIS — G47.33 OSA (OBSTRUCTIVE SLEEP APNEA): Primary | ICD-10-CM

## 2019-11-26 NOTE — PROGRESS NOTES
The patient underwent sleep testing on 11/14/19 . Recommendations listed below. Please refer to full report for specific details. Overall, the patient appeared to tolerate stud well and reported sleeping similar to normal sleep pattern. The patient was started on a CPAP titration. ORIANA physiology appeared well optimized at a CPAP setting of 8 cwp. Spontaneous arousal not associated with sleep breathing disorder persisted throughout the study. Brief cycles of REM were achieved. Loss of REM atonia without any abnormal sleep behaviors was also observed. The PLM index was not elevated. PRESSURES USED DURING THE STUDY: 4, 6, 8 cwp. DIAGNOSIS:  1) Obstructive Sleep Apnea 2) Loss of REM Atonia  3) Spontaneous Arousals. RECOMMENDATIONS:      Recommend starting patient on CPAP 8 cwp and monitor for response.  Other non-invasive treatment options are recommended were applicable and include the following: weight reduction, smoking cessation, body position training, and modification of alcohol ingestion and/or sedating agents.  If these treatments are not possible or effective, an oral appliance may be an alternative and/or adjuvant non-invasive treatment.  If non-invasive treatments are not possible or effective, consideration may be given to possible corrective surgical options.  Healthy sleep habit education and reinforcement will be reviewed with the patient.  Individuals are encouraged to obtain 7-9 hours of sleep per day.   safety is encouraged. Drowsy and/or inattentive driving should be avoided.  Follow up with referring provider as directed.     Chip Push, DO, FCCP    [facility]  Pulmonary, Critical Care, and Sleep Medicine

## 2019-11-27 ENCOUNTER — TELEPHONE (OUTPATIENT)
Dept: PULMONOLOGY | Age: 39
End: 2019-11-27

## 2019-12-04 ENCOUNTER — TELEPHONE (OUTPATIENT)
Dept: PULMONOLOGY | Age: 39
End: 2019-12-04

## 2019-12-04 NOTE — TELEPHONE ENCOUNTER
BELLA FROM [facility] CALLED([phone]). THEY WILL NOT BE ABLE TO PROVIDE CPAP EQUIPMENT FOR THIS PT DUE TO HIS INSURANCE. PLEASE INFORM  DR PRADHAN.

## 2019-12-27 ENCOUNTER — DOCUMENTATION ONLY (OUTPATIENT)
Dept: PULMONOLOGY | Age: 39
End: 2019-12-27

## 2019-12-27 NOTE — PROGRESS NOTES
Pt to call back with new insurance info so we can resend Cpap order to correct company.   Make apt once pt gets Cpap

## 2019-12-30 NOTE — TELEPHONE ENCOUNTER
Not sure how [employer] got involved with order, because patient has [facility] so order was faxed to [address] [place] [address].

## 2020-07-15 ENCOUNTER — OFFICE VISIT (OUTPATIENT)
Dept: PULMONOLOGY | Age: 40
End: 2020-07-15

## 2020-07-15 VITALS
WEIGHT: 172 LBS | DIASTOLIC BLOOD PRESSURE: 69 MMHG | HEART RATE: 70 BPM | SYSTOLIC BLOOD PRESSURE: 127 MMHG | OXYGEN SATURATION: 99 % | TEMPERATURE: 97.8 F | RESPIRATION RATE: 16 BRPM | BODY MASS INDEX: 25.48 KG/M2 | HEIGHT: 69 IN

## 2020-07-15 DIAGNOSIS — G47.8 SLEEP PARALYSIS: ICD-10-CM

## 2020-07-15 DIAGNOSIS — R44.2 HYPNAGOGIC HALLUCINATIONS: ICD-10-CM

## 2020-07-15 DIAGNOSIS — G47.31 COMPLEX SLEEP APNEA SYNDROME: ICD-10-CM

## 2020-07-15 DIAGNOSIS — Z99.89 OSA ON CPAP: Primary | ICD-10-CM

## 2020-07-15 DIAGNOSIS — G47.33 OSA ON CPAP: Primary | ICD-10-CM

## 2020-07-15 NOTE — PROGRESS NOTES
Ruddy Norman presents today for   Chief Complaint   Patient presents with    Sleep Apnea       Is someone accompanying this pt? No    Is the patient using any DME equipment during OV? CPAP    -DME Company [employer]     Depression Screening:  3 most recent PHQ Screens 7/18/2019   Little interest or pleasure in doing things Not at all   Feeling down, depressed, irritable, or hopeless Not at all   Total Score PHQ 2 0       Learning Assessment:  Learning Assessment 5/22/2019   PRIMARY LEARNER Patient   HIGHEST LEVEL OF EDUCATION - PRIMARY LEARNER  2 YEARS [address] [employer] LEARNER -   CO-LEARNER CAREGIVER No   PRIMARY LANGUAGE ENGLISH   LEARNER PREFERENCE PRIMARY READING     -     -   ANSWERED BY patient   RELATIONSHIP SELF       Abuse Screening:  Abuse Screening Questionnaire 11/21/2018   Do you ever feel afraid of your partner? N   Are you in a relationship with someone who physically or mentally threatens you? N   Is it safe for you to go home? Y       Fall Risk  No flowsheet data found. Coordination of Care:  1. Have you been to the ER, urgent care clinic since your last visit? Hospitalized since your last visit? No    2. Have you seen or consulted any other health care providers outside of the [facility] since your last visit? Include any pap smears or colon screening.  No

## 2020-07-15 NOTE — PROGRESS NOTES
Replaced by [facility]  Pulmonary, Critical Care, and Sleep Medicine    Office Progress Note      Primary Care Physician: Petey Prado MD     Reason for Visit:  Follow Up    Assessment:  1. Obstructive Sleep Apnea (ORIANA)- Mild (AHI: 8.5): Patient reports clinical benefit when using. His compliance is suboptimal and I have discussed this with the patient in depth today. The patient reports he will increase his use time. 2. Hypersomnia with caffeine intake. Also reports sleep paralysis with sleep onset which is also associated with hypnagogic hallucinations. -Patient reports these symptoms have nearly resolved since starting PAP therapy. Rosebud is also improved at 8/24. 3. Chest discomfort with palpitations- unclear if related to supplement use- Cardiology following. Echo notable for mild aortic root amd right atrium dilation. 4. Mobitz 1 - [place]  On EKG: Possibly aggravated by caffeine and supplement use which patient was taking to address hypersomnia symptoms  5. Excess caffeine intake      Plan:  · Continue with current PAP therapy  · Encourage increase use of CPAP  · Renew order for PAP supplies  · Monitor report of sleep paralysis and HH. · Schedule patient for in- lab sleep study. If ORIANA is present will pursue PAP therapy. If no ORIANA on testing them will proceed with morning MSLT testing for further evaluation. · Avoid external stimulants/ supplements  · UDS at time of sleep testing- I have discussed this with the patient and he agrees. · Potential consequences of untreated sleep apnea, and/or excessive daytime sleepiness were discussed with the patient. · Educational materials provided. · Healthy lifestyle changes to include weight loss and exercise discussed. · Healthy sleep habits were reviewed and encouraged. - Patient given sleep diary. He needs to obtain 7-9- preferably 8-9 hours of sleep per night.   · Monitor for any return of symptoms- if any recurrent dyspnea symptoms will pursue PFTs and other relevant diagnostic studies. ·  and workplace safety reviewed and discussed as appropriate. Drowsy and/or inattentive driving should be avoided. - Per patient this is not an issue at this time  · Follow-up in one year, sooner should new symptoms or problems arise. · Follow up with cardiologist as directed. · Follow up with Primary Care Provider (PCP) as directed and for routine health care maintenance. History of Present Illness: Mr. Edita Hernandez is a 38 y.o. male patient who presents for evaluation for possible sleep breathing disorder. The history was provided by the patient. He was referred by his OP cardiologist.    On 5/20/19, the patient reports that he was working on a webinar and noted some chest discomfort- more of a non- reproducible tightness. He drove to  an item and was abruptly cut off in traffic and his chest discomfort intensified and was associated with dyspnea- no loss of muscle tone. . A friend took him to to [facility] ED and was noted to have mobitz 1 on EKG. ED workup was other wise negative and he was referred to cardiology for further evaluation. He was taking a pre-workout (preoteine- creatine) supplements, energy drinks, BC Powder with high water volumes at that time. He has since stopped that supplement    No history of asthma or wheezing. Symptoms from that time has fulling resolved, No leg pain or tenderness. Today the following is noted and/or reported:  · Since the patient's last visit, he underwent a PSG, MSLT and PAP titration. The PSG was notable for mild complex sleep apnea. The MSL was reduced at 00:04:40 which with an elevated AHI is more consistent with secondary hypersomnia. A CPAP titration which noted that CSA was optimized with a CPAP of 8 cwp. · The patient reports that he was skeptical about PAP therapy but that he is pleasantly surprised  · He states that his EDS is much improved.  He states he no longer feels \"so foggy\"  · He states that he has had some mask issues. He had tried a nasal and FFM but that he now uses nasal pillows which he likes best  · He states that when he was using a FFM he was struggling with the fit and that it was off and he had an episode of sleep paralysis and seeing shadows. No events since he started using the nasal pillows. · He does note some mid Xerostomia. He has not tried adjusting the humidity  · No Aerophagia or bloating  · He will occassionally kick in his sleep- no other unusual sleep behaviors  · Drowsy driving is currently resolved  · Due to [employer] he is working every other day. He reports that he does wear a mask in public and is practicing physical distancing  · He is cleaning his device with CPAP wipes. 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 Sleep paralysis is reported and occurs with sleep onset: 1-2 times weekly    Hypnagogic and/or hypnopompic hallucinations is reports- seeing shadows, \"I can't wave them off because I feel like I am stuck\". This tends to occur with sleep paralysis. Sleep talking is reported- more mumbling. Other unusual and/or parasomnia behaviors is reported. - Reports of punching and jabbing. He has woken himself up doing this. Family Sleep History:  - Both parents snore      Stop Bang 11/14/2019 9/16/2019 7/18/2019   Does the patient snore loudly (louder than talking or loud enough to be heard through closed doors)? 0 0 0   Does the patient often feel tired, fatigued, or sleepy during the daytime, even after a \"good\" night's sleep? 1 1 1   Has anyone ever observed the patient stop breathing during their sleep?   1 1 1   Does the patient have or are they being treated for high blood pressure? 0 0 0   Is the patient's BMI greater than 35? 0 0 0   Is your neck circumference greater than 17 inches (Male) or 16 inches (Female)? 0 0 0   Is the patient older than 48? 0 0 0   Is the patient male? 1 1 1   ORIANA Score 3 3 3   Has the patient been referred to Sleep Medicine? 1 1 -   Has the patient previously been diagnosed with Obstructive Sleep Apnea? 0 0 -       3 most recent PHQ Screens 7/15/2020   Little interest or pleasure in doing things Not at all   Feeling down, depressed, irritable, or hopeless Not at all   Total Score PHQ 2 0       Lee Scale 7/15/2020 11/14/2019 9/16/2019 7/18/2019   Sitting and Reading 1 2 2 2   Watching TV 1 2 2 2   Sitting, inactive in a public place (e.g. a movie theater or meeting) 1 1 2 1   As a passenger in a car for an hour, without a break 2 2 3 2   Lying down to rest in the afternoon, when circumstances permit 3 3 1 2   Sitting and talking to someone 0 1 0 0   Sitting quietly after lunch without alcohol 0 1 1 1   In a car, while stopped for a few minutes in traffic 0 1 1 1   Lee Sleepiness Score 8 13 12 11              Past Medical History:  History reviewed. No pertinent past medical history. Past Surgical History:  Past Surgical History:   Procedure Laterality Date    HX WISDOM TEETH EXTRACTION         Family History:  Family History   Problem Relation Age of Onset    Diabetes Mother     Heart Disease Father         CABG in his 62s       Social History:    Caffeine Amount Time of last Intake Comments   Coffee 1 C/Am and occasional  afternoon     Soda Rare     Tea Rare     Energy Drinks Quit after ER visit  May 2019   Over- the - counter stimulant pills None     Other Substances      Alcohol Social  Gin- tonic or wine   Tobacco None     Drugs Pre Work out         Medications:  Current Outpatient Medications on File Prior to Visit   Medication Sig Dispense Refill    loratadine (CLARITIN PO) Take  by mouth. No current facility-administered medications on file prior to visit.          Allergy:  No Known Allergies    Review of Systems  General ROS: negative for - chills, fever, hot flashes, malaise or night sweats  ENT ROS: negative for - epistaxis, headaches, hearing change, nasal congestion, nasal discharge, nasal polyps, oral lesions, sinus pain, sneezing, sore throat, tinnitus, vertigo, visual changes or vocal changes  Hematological and Lymphatic ROS: negative for - bleeding problems, blood clots, bruising, jaundice, pallor or swollen lymph nodes  Endocrine ROS: negative for - polydipsia/polyuria, skin changes, temperature intolerance or unexpected weight changes  Respiratory ROS: no cough, shortness of breath, or wheezing  Cardiovascular ROS: no chest pain or dyspnea on exertion-other than noted above  Gastrointestinal ROS: no abdominal pain, change in bowel habits, or black or bloody stools  Genito-Urinary ROS: no dysuria, trouble voiding, or hematuria  Musculoskeletal ROS: negative  Neurological ROS: no TIA or stroke symptoms  Dermatological ROS: negative for - pruritus, rash or skin lesion changes   Psychological ROS: as above   Otherwise negative and per HPI      Physical Exam:  Blood pressure 127/69, pulse 70, temperature 97.8 °F (36.6 °C), temperature source Oral, resp. rate 16, height 5' 9\" (1.753 m), weight 78 kg (172 lb), SpO2 99 %. on RA, Body mass index is 25.4 kg/m². General: No distress, acyanotic, appears stated age, cooperative, pleasant  HEENT: PERRL, EOMI, throat without erythema or exudate, Tongue- dental indention on tongue, Mallampati's score 1+, Uvula- midline, Tonsils- 1  Neck: Supple,  no abnormally enlarged lymph nodes, thyroid is not enlarged, non-tender, No JVD, No carotid bruit  Chest: Normal.  Lungs: Moderate air entry, clear to auscultation bilaterally,   Heart: Regular rate and rhythm, S1S2 present, without murmur. Abdomen: Flat, bowel sounds normoactive, abdomen is soft without significant tenderness, or guarding. Extremity: Negative for cyanosis, edema, or clubbing.   Skin: Skin color, texture, turgor normal. No rashes or lesions. Neurological: CN 2-12 grossly intact, normal muscle tone. Data Reviewed:  CBC:   Lab Results   Component Value Date/Time    WBC 6.2 11/22/2017 12:00 AM    HGB 15.2 11/22/2017 12:00 AM    HCT 47.1 11/22/2017 12:00 AM    PLATELET 341 88/88/2433 12:00 AM    MCV 95 11/22/2017 12:00 AM       BMP:   Lab Results   Component Value Date/Time    Sodium 139 11/22/2017 12:00 AM    Potassium 4.8 11/22/2017 12:00 AM    Chloride 101 11/22/2017 12:00 AM    CO2 24 11/22/2017 12:00 AM    Glucose 91 11/22/2017 12:00 AM    BUN 18 11/22/2017 12:00 AM    Creatinine 1.04 11/22/2017 12:00 AM    BUN/Creatinine ratio 17 11/22/2017 12:00 AM    GFR est  11/22/2017 12:00 AM    GFR est non-AA 91 11/22/2017 12:00 AM    Calcium 9.6 11/22/2017 12:00 AM        TSH:  No results found for: TSH, TSH2, TSH3, TSHP, TSHELE, TSHEXT, TSHEXT     No results found for: HBA1C, HGBE8, VJL5DRXH, DJS6JOPB, BWN4YLDC      Imaging:  [x]I have personally reviewed the patients radiographs section     Interface, [facility] Rad Res - 05/20/2019  7:59 PM EDT  EXAM: CHEST PA AND LATERAL    CLINICAL HISTORY/INDICATION: CHEST PAIN    COMPARISON: None    TECHNIQUE: 2 views. FINDINGS: No consolidation. Mediastinal silhouette and pulmonary vasculature unremarkable. No evidence of pneumothorax. No acute osseous findings. IMPRESSION    No acute findings. Signed By: Sae Brewer MD on 5/20/2019 7:57 PM    Cardiac Echo:     06/05/19   ECHO ADULT COMPLETE 06/05/2019 6/5/2019    Narrative · Left Ventricle: Estimated left ventricular ejection fraction is 56 -   60%. Visually measured ejection fraction. No regional wall motion   abnormality noted. Inconclusive left ventricular diastolic function. · Aorta: Mild aortic root dilatation. · Right Atrium: Mildly dilated right atrium.         Signed by: Jelena Farias MD     EKG 12 LEAD UNIT PERFORMED  5/21/2019  Alex and Ani  Component Name Value Ref Range   Heart Rate 66 bpm   RR Interval 908 ms Atrial Rate 101 ms   P-R Interval 100 ms   P Duration 70 ms   P Horizontal Axis 17 deg   P Front Axis 47 deg   Q Onset 502 ms   QRSD Interval 95 ms   QT Interval 399 ms   QTcB 419 ms   QTcF 412 ms   QRS Horizontal Axis -24 deg   QRS Axis 58 deg   I-40 Front Axis 63 deg   t-40 Horizontal Axis -71 deg   T-40 Front Axis -80 deg   T Horizontal Axis 23 deg   T Wave Axis 51 deg   S-T Horizontal Axis 30 deg   S-T Front Axis 54 deg   Impression - ABNORMAL ECG -     Impression WENCK-Second deg AVB, Mobitz I (Wenckebach)-AZ lengthens & dropped complexes     Impression NOECG-No ECG for comparison-          Historical Sleep Testing Data:   · PS19: CSA, AHI: 8.5  YADIEL:3, ZACH: 6.1, PLMI: 1.3  RDI: 11.4   · MSLT: 19:  MSL: 00:04:40, No SOREMS: secondary hypersomnia  · CPAP Titration: 19: CPAP 8      Saúl Burroughs DO, [facility]  P[facility], Sleep and Critical Care Medicine

## 2020-11-11 ENCOUNTER — OFFICE VISIT (OUTPATIENT)
Dept: FAMILY MEDICINE CLINIC | Age: 40
End: 2020-11-11

## 2020-11-11 VITALS
HEART RATE: 76 BPM | OXYGEN SATURATION: 97 % | DIASTOLIC BLOOD PRESSURE: 64 MMHG | BODY MASS INDEX: 25.92 KG/M2 | SYSTOLIC BLOOD PRESSURE: 110 MMHG | WEIGHT: 175 LBS | HEIGHT: 69 IN | TEMPERATURE: 98 F | RESPIRATION RATE: 14 BRPM

## 2020-11-11 DIAGNOSIS — Z13.220 SCREENING CHOLESTEROL LEVEL: ICD-10-CM

## 2020-11-11 DIAGNOSIS — Z23 ENCOUNTER FOR IMMUNIZATION: ICD-10-CM

## 2020-11-11 DIAGNOSIS — Z99.89 OSA ON CPAP: ICD-10-CM

## 2020-11-11 DIAGNOSIS — Z00.00 WELL ADULT EXAM: Primary | ICD-10-CM

## 2020-11-11 DIAGNOSIS — M72.2 PLANTAR FASCIITIS, LEFT: ICD-10-CM

## 2020-11-11 DIAGNOSIS — G47.33 OSA ON CPAP: ICD-10-CM

## 2020-11-11 PROCEDURE — 99396 PREV VISIT EST AGE 40-64: CPT | Performed by: FAMILY MEDICINE

## 2020-11-11 PROCEDURE — 90686 IIV4 VACC NO PRSV 0.5 ML IM: CPT | Performed by: FAMILY MEDICINE

## 2020-11-11 PROCEDURE — 90471 IMMUNIZATION ADMIN: CPT | Performed by: FAMILY MEDICINE

## 2020-11-11 PROCEDURE — 90715 TDAP VACCINE 7 YRS/> IM: CPT | Performed by: FAMILY MEDICINE

## 2020-11-11 PROCEDURE — 90472 IMMUNIZATION ADMIN EACH ADD: CPT | Performed by: FAMILY MEDICINE

## 2020-11-11 NOTE — PATIENT INSTRUCTIONS
Influenza (Flu) Vaccine (Inactivated or Recombinant): What You Need to Know Why get vaccinated? Influenza vaccine can prevent influenza (flu). Flu is a contagious disease that spreads around the [place] every year, usually between October and May. Anyone can get the flu, but it is more dangerous for some people. Infants and young children, people 72years of age and older, pregnant women, and people with certain health conditions or a weakened immune system are at greatest risk of flu complications. Pneumonia, bronchitis, sinus infections and ear infections are examples of flu-related complications. If you have a medical condition, such as heart disease, cancer or diabetes, flu can make it worse. Flu can cause fever and chills, sore throat, muscle aches, fatigue, cough, headache, and runny or stuffy nose. Some people may have vomiting and diarrhea, though this is more common in children than adults. Each year, thousands of people in the [facility] die from flu, and many more are hospitalized. Flu vaccine prevents millions of illnesses and flu-related visits to the doctor each year. Influenza vaccine CDC recommends everyone 10months of age and older get vaccinated every flu season. Children 6 months through 6years of age may need 2 doses during a single flu season. Everyone else needs only 1 dose each flu season. It takes about 2 weeks for protection to develop after vaccination. There are many flu viruses, and they are always changing. Each year a new flu vaccine is made to protect against three or four viruses that are likely to cause disease in the upcoming flu season. Even when the vaccine doesn't exactly match these viruses, it may still provide some protection. Influenza vaccine does not cause flu. Influenza vaccine may be given at the same time as other vaccines. Talk with your health care provider Tell your vaccine provider if the person getting the vaccine: · Has had an allergic reaction after a previous dose of influenza vaccine, or has any severe, life-threatening allergies. · Has ever had Guillain-Barré Syndrome (also called GBS). In some cases, your health care provider may decide to postpone influenza vaccination to a future visit. People with minor illnesses, such as a cold, may be vaccinated. People who are moderately or severely ill should usually wait until they recover before getting influenza vaccine. Your health care provider can give you more information. Risks of a vaccine reaction · Soreness, redness, and swelling where shot is given, fever, muscle aches, and headache can happen after influenza vaccine. · There may be a very small increased risk of Guillain-Barré Syndrome (GBS) after inactivated influenza vaccine (the flu shot). Rexann Logan children who get the flu shot along with pneumococcal vaccine (PCV13), and/or DTaP vaccine at the same time might be slightly more likely to have a seizure caused by fever. Tell your health care provider if a child who is getting flu vaccine has ever had a seizure. People sometimes faint after medical procedures, including vaccination. Tell your provider if you feel dizzy or have vision changes or ringing in the ears. As with any medicine, there is a very remote chance of a vaccine causing a severe allergic reaction, other serious injury, or death. What if there is a serious problem? An allergic reaction could occur after the vaccinated person leaves the clinic. If you see signs of a severe allergic reaction (hives, swelling of the face and throat, difficulty breathing, a fast heartbeat, dizziness, or weakness), call 9-1-1 and get the person to the nearest hospital. 
For other signs that concern you, call your health care provider. Adverse reactions should be reported to the Vaccine Adverse Event Reporting System (VAERS).  Your health care provider will usually file this report, or you can do it yourself. Visit the VAERS website at www.vaers. hhs.gov or call 7-296.265.7861. VAERS is only for reporting reactions, and VAERS staff do not give medical advice. The National Vaccine Injury Compensation Program 
The National Vaccine Injury Compensation Program (VICP) is a federal program that was created to compensate people who may have been injured by certain vaccines. Visit the VICP website at www.hrsa.gov/vaccinecompensation or call 8-865.663.8290 to learn about the program and about filing a claim. There is a time limit to file a claim for compensation. How can I learn more? · Ask your healthcare provider. · Call your local or state health department. · Contact the Centers for Disease Control and Prevention (CDC): 
? Call 7-422.260.6866 (6-666-CYA-INFO) or 
? Visit CDC's website at www.cdc.gov/flu Vaccine Information Statement (Interim) Inactivated Influenza Vaccine 8/15/2019 
42 ROXYJulieth Aparicio Zo [id number] [facility] and [employer] Centers for Disease Control and Prevention Many Vaccine Information Statements are available in Sami and other languages. See www.immunize.org/vis. Muchas hojas de información sobre vacunas están disponibles en español y en otros idiomas. Visite www.immunize.org/vis. Care instructions adapted under license by [employer] (which disclaims liability or warranty for this information). If you have questions about a medical condition or this instruction, always ask your healthcare professional. [place] [address] any warranty or liability for your use of this information. Tdap (Tetanus, Diphtheria, Pertussis) Vaccine: What You Need to Know Why get vaccinated? Tdap vaccine can prevent tetanus, diphtheria, and pertussis. Diphtheria and pertussis spread from person to person. Tetanus enters the body through cuts or wounds. · TETANUS (T) causes painful stiffening of the muscles.  Tetanus can lead to serious health problems, including being unable to open the mouth, having trouble swallowing and breathing, or death. · DIPHTHERIA (D) can lead to difficulty breathing, heart failure, paralysis, or death. · PERTUSSIS (aP), also known as \"whooping cough,\" can cause uncontrollable, violent coughing which makes it hard to breathe, eat, or drink. Pertussis can be extremely serious in babies and young children, causing pneumonia, convulsions, brain damage, or death. In teens and adults, it can cause weight loss, loss of bladder control, passing out, and rib fractures from severe coughing. Tdap vaccine Tdap is only for children 7 years and older, adolescents, and adults. Adolescents should receive a single dose of Tdap, preferably at age 6 or 15 years. Pregnant women should get a dose of Tdap during every pregnancy, to protect the  from pertussis. Infants are most at risk for severe, life threatening complications from pertussis. Adults who have never received Tdap should get a dose of Tdap. Also, adults should receive a booster dose every 10 years, or earlier in the case of a severe and dirty wound or burn. Booster doses can be either Tdap or Td (a different vaccine that protects against tetanus and diphtheria but not pertussis). Tdap may be given at the same time as other vaccines. Talk with your health care provider Tell your vaccine provider if the person getting the vaccine: 
· Has had an allergic reaction after a previous dose of any vaccine that protects against tetanus, diphtheria, or pertussis, or has any severe, life threatening allergies. · Has had a coma, decreased level of consciousness, or prolonged seizures within 7 days after a previous dose of any pertussis vaccine (DTP, DTaP, or Tdap). · Has seizures or another nervous system problem. · Has ever had Guillain-Barré Syndrome (also called GBS).  
· Has had severe pain or swelling after a previous dose of any vaccine that protects against tetanus or diphtheria. In some cases, your health care provider may decide to postpone Tdap vaccination to a future visit. People with minor illnesses, such as a cold, may be vaccinated. People who are moderately or severely ill should usually wait until they recover before getting Tdap vaccine. Your health care provider can give you more information. Risks of a vaccine reaction · Pain, redness, or swelling where the shot was given, mild fever, headache, feeling tired, and nausea, vomiting, diarrhea, or stomachache sometimes happen after Tdap vaccine. People sometimes faint after medical procedures, including vaccination. Tell your provider if you feel dizzy or have vision changes or ringing in the ears. As with any medicine, there is a very remote chance of a vaccine causing a severe allergic reaction, other serious injury, or death. What if there is a serious problem? An allergic reaction could occur after the vaccinated person leaves the clinic. If you see signs of a severe allergic reaction (hives, swelling of the face and throat, difficulty breathing, a fast heartbeat, dizziness, or weakness), call 9-1-1 and get the person to the nearest hospital. 
For other signs that concern you, call your health care provider. Adverse reactions should be reported to the Vaccine Adverse Event Reporting System (VAERS). Your health care provider will usually file this report, or you can do it yourself. Visit the VAERS website at www.vaers. hhs.gov or call 5-363.813.3996. VAERS is only for reporting reactions, and VAERS staff do not give medical advice. The National Vaccine Injury Compensation Program 
The National Vaccine Injury Compensation Program (VICP) is a federal program that was created to compensate people who may have been injured by certain vaccines.  Visit the VICP website at www.hrsa.gov/vaccinecompensation or call 6-345.533.2711 to learn about the program and about filing a claim. There is a time limit to file a claim for compensation. How can I learn more? · Ask your health care provider. · Call your local or state health department. · Contact the Centers for Disease Control and Prevention (CDC): 
? Call 9-785.932.7254 (1-800-CDC-INFO) or 
? Visit CDC's website at www.cdc.gov/vaccines Vaccine Information Statement (Interim) Tdap (Tetanus, Diphtheria, Pertussis) Vaccine 04/01/2020 
42 JOHN Quinn [id number] [facility] and for; to (do) Centers for Disease Control and Prevention Many Vaccine Information Statements are available in [place] and other languages. See www.immunize.org/vis. Muchas hojas de información sobre vacunas están disponibles en español y en otros idiomas. Visite www.immunize.org/vis. Care instructions adapted under license by [employer] (which disclaims liability or warranty for this information). If you have questions about a medical condition or this instruction, always ask your healthcare professional. [place] [address] any warranty or liability for your use of this information. Well Visit, Ages 25 to 48: Care Instructions Your Care Instructions Physical exams can help you stay healthy. Your doctor has checked your overall health and may have suggested ways to take good care of yourself. He or she also may have recommended tests. At home, you can help prevent illness with healthy eating, regular exercise, and other steps. Follow-up care is a key part of your treatment and safety. Be sure to make and go to all appointments, and call your doctor if you are having problems. It's also a good idea to know your test results and keep a list of the medicines you take. How can you care for yourself at home? · Reach and stay at a healthy weight. This will lower your risk for many problems, such as obesity, diabetes, heart disease, and high blood pressure. · Get at least 30 minutes of physical activity on most days of the week. Walking is a good choice. You also may want to do other activities, such as running, swimming, cycling, or playing tennis or team sports. Discuss any changes in your exercise program with your doctor. · Do not smoke or allow others to smoke around you. If you need help quitting, talk to your doctor about stop-smoking programs and medicines. These can increase your chances of quitting for good. · Talk to your doctor about whether you have any risk factors for sexually transmitted infections (STIs). Having one sex partner (who does not have STIs and does not have sex with anyone else) is a good way to avoid these infections. · Use birth control if you do not want to have children at this time. Talk with your doctor about the choices available and what might be best for you. · Protect your skin from too much sun. When you're outdoors from 10 a.m. to 4 p.m., stay in the shade or cover up with clothing and a hat with a wide brim. Wear sunglasses that block UV rays. Even when it's cloudy, put broad-spectrum sunscreen (SPF 30 or higher) on any exposed skin. · See a dentist one or two times a year for checkups and to have your teeth cleaned. · Wear a seat belt in the car. Follow your doctor's advice about when to have certain tests. These tests can spot problems early. For everyone · Cholesterol. Have the fat (cholesterol) in your blood tested after age 21. Your doctor will tell you how often to have this done based on your age, family history, or other things that can increase your risk for heart disease. · Blood pressure. Have your blood pressure checked during a routine doctor visit. Your doctor will tell you how often to check your blood pressure based on your age, your blood pressure results, and other factors. · Vision.  Talk with your doctor about how often to have a glaucoma test. 
 · Diabetes. Ask your doctor whether you should have tests for diabetes. · Colon cancer. Your risk for colorectal cancer gets higher as you get older. Some experts say that adults should start regular screening at age 48 and stop at age 76. Others say to start before age 48 or continue after age 76. Talk with your doctor about your risk and when to start and stop screening. For women · Breast exam and mammogram. Talk to your doctor about when you should have a clinical breast exam and a mammogram. Medical experts differ on whether and how often women under 50 should have these tests. Your doctor can help you decide what is right for you. · Cervical cancer screening test and pelvic exam. Begin with a Pap test at age 24. The test often is part of a pelvic exam. Starting at age 27, you may choose to have a Pap test, an HPV test, or both tests at the same time (called co-testing). Talk with your doctor about how often to have testing. · Tests for sexually transmitted infections (STIs). Ask whether you should have tests for STIs. You may be at risk if you have sex with more than one person, especially if your partners do not wear condoms. For men · Tests for sexually transmitted infections (STIs). Ask whether you should have tests for STIs. You may be at risk if you have sex with more than one person, especially if you do not wear a condom. · Testicular cancer exam. Ask your doctor whether you should check your testicles regularly. · Prostate exam. Talk to your doctor about whether you should have a blood test (called a PSA test) for prostate cancer. Experts differ on whether and when men should have this test. Some experts suggest it if you are older than 39 and are -American or have a father or brother who got prostate cancer when he was younger than 72. When should you call for help?  
Watch closely for changes in your health, and be sure to contact your doctor if you have any problems or symptoms that concern you. Where can you learn more? Go to http://www.[employer].com/ Enter P072 in the search box to learn more about \"Well Visit, Ages 25 to 48: Care Instructions. \" Current as of: May 27, 2020               Content Version: 12.6 © 1281-5032 [employer]. Care instructions adapted under license by [employer] (which disclaims liability or warranty for this information). If you have questions about a medical condition or this instruction, always ask your healthcare professional. [address] any warranty or liability for your use of this information.

## 2020-11-11 NOTE — PROGRESS NOTES
Chief Complaint   Patient presents with    Physical     Subjective:   Nora Arrieta is a 38 y.o. male presenting for his annual checkup. ROS:  Complete 10 system ROS is obtained from the patient which is negative other than HPI    Specific concerns today:   Plantar fasciitis still somewhat bothersome. Has had no further cardiac complaints. Saw cardiology. Had normal ECHO. He has had much benefit with CPAP after an ORIANA diagnosis. No Known Allergies  Past Medical History:   Diagnosis Date    Mobitz (type) I (Wenckebach's) atrioventricular block     saw cardiology, listed in A/P of their note    ORIANA on CPAP 2020     Past Surgical History:   Procedure Laterality Date    HX WISDOM TEETH EXTRACTION       Family History   Problem Relation Age of Onset    Diabetes Mother     Heart Disease Father         CABG in his 62s     Social History     Tobacco Use    Smoking status: Never Smoker    Smokeless tobacco: Never Used    Tobacco comment: Hookah axp 1 a month   Substance Use Topics    Alcohol use: Yes     Alcohol/week: 2.0 standard drinks     Types: 2 Glasses of wine per week     Frequency: 2-3 times a week     Drinks per session: 1 or 2     Binge frequency: Never     Comment: Occasionally      Objective:     Visit Vitals  /64 (BP 1 Location: Left arm, BP Patient Position: Sitting)   Pulse 76   Temp 98 °F (36.7 °C) (Oral)   Resp 14   Ht 5' 9\" (1.753 m)   Wt 175 lb (79.4 kg)   SpO2 97%   BMI 25.84 kg/m²     The patient appears well, alert, oriented x 3, in no distress. ENT normal with the exception of mild nasal turbinates being engorged and posterior pharyngeal cobblestoning. Neck supple. No adenopathy or thyromegaly. ARTHUR. Lungs are clear, good air entry, no wheezes, rhonchi or rales. S1 and S2 normal, no murmurs, regular rate but abnormal rhythm. Abdomen is soft without tenderness, guarding, mass or organomegaly. Extremities show no edema.  Neurological is normal without focal findings. Psych: normal affect. Mood good. Oriented x 3. Judgement and insight intact. Assessment/Plan:       ICD-10-CM ICD-9-CM    1. Well adult exam  Z00.00 V70.0 CBC W/O DIFF      METABOLIC PANEL, BASIC   2. ORIANA on CPAP  G47.33 327.23     Z99.89 V46.8    3. Plantar fasciitis, left  M72.2 728.71    4. Encounter for immunization  Z23 V03.89 DC IMMUNIZ,ADMIN,EACH ADDL      INFLUENZA VIRUS VAC QUAD,SPLIT,PRESV FREE SYRINGE IM      TETANUS, DIPHTHERIA TOXOIDS AND ACELLULAR PERTUSSIS VACCINE (TDAP), IN INDIVIDS. >=7, IM   5. Screening cholesterol level  Z13.220 V77.91 LIPID PANEL     Age and sex specific counseling. Advised on plantar fascia exercises but he can call for formal PT after pandemic if he wants. Flu vaccine and Tdap administered. Reviewed cardiology and sleep notes. Cont current care. Fasting labs ordered. All chart history elements were reviewed by me at the time of the visit even though marked at time of note closure. Patient understands our medical plan. Patient has provided input and agrees with goals. Alternatives have been explained and offered. All questions answered. The patient is to call if condition worsens or fails to improve. Follow-up and Dispositions    · Return in about 1 year (around 11/11/2021) for physical. Fasting labs due at your earliest convenience.

## 2020-11-11 NOTE — PROGRESS NOTES
1. Have you been to the ER, urgent care clinic since your last visit? Hospitalized since your last visit? No    2. Have you seen or consulted any other health care providers outside of the [address] [place] [address] since your last visit? Include any pap smears or colon screening. No    Physician order obtained. Patient completed adult immunization consent form. Allergies, contraindications and recommendations reviewed with patient. Seasonal influenza vaccine administered IM left deltoid and TDAP administered IM right deltoid. Patient tolerated well. Patient remained in office for 15 minutes after injection and no adverse reactions were noted.

## 2020-11-19 ENCOUNTER — HOSPITAL ENCOUNTER (OUTPATIENT)
Dept: LAB | Age: 40
Discharge: HOME | End: 2020-11-19
Payer: COMMERCIAL

## 2020-11-19 DIAGNOSIS — Z13.220 SCREENING CHOLESTEROL LEVEL: ICD-10-CM

## 2020-11-19 DIAGNOSIS — Z00.00 WELL ADULT EXAM: ICD-10-CM

## 2020-11-19 LAB
ANION GAP SERPL CALC-SCNC: 2 MMOL/L (ref 3–18)
BUN SERPL-MCNC: 19 MG/DL (ref 7–18)
BUN/CREAT SERPL: 17 (ref 12–20)
CALCIUM SERPL-MCNC: 9.6 MG/DL (ref 8.5–10.1)
CHLORIDE SERPL-SCNC: 107 MMOL/L (ref 100–111)
CHOLEST SERPL-MCNC: 178 MG/DL
CO2 SERPL-SCNC: 30 MMOL/L (ref 21–32)
CREAT SERPL-MCNC: 1.1 MG/DL (ref 0.6–1.3)
ERYTHROCYTE [DISTWIDTH] IN BLOOD BY AUTOMATED COUNT: 12.3 % (ref 11.6–14.5)
GLUCOSE SERPL-MCNC: 96 MG/DL (ref 74–99)
HCT VFR BLD AUTO: 45.7 % (ref 36–48)
HDLC SERPL-MCNC: 67 MG/DL (ref 40–60)
HDLC SERPL: 2.7 {RATIO} (ref 0–5)
HGB BLD-MCNC: 15.4 G/DL (ref 13–16)
LDLC SERPL CALC-MCNC: 93.4 MG/DL (ref 0–100)
LIPID PROFILE,FLP: ABNORMAL
MCH RBC QN AUTO: 31.2 PG (ref 24–34)
MCHC RBC AUTO-ENTMCNC: 33.7 G/DL (ref 31–37)
MCV RBC AUTO: 92.7 FL (ref 74–97)
PLATELET # BLD AUTO: 360 K/UL (ref 135–420)
PMV BLD AUTO: 9 FL (ref 9.2–11.8)
POTASSIUM SERPL-SCNC: 4.6 MMOL/L (ref 3.5–5.5)
RBC # BLD AUTO: 4.93 M/UL (ref 4.7–5.5)
SODIUM SERPL-SCNC: 139 MMOL/L (ref 136–145)
TRIGL SERPL-MCNC: 88 MG/DL (ref ?–150)
VLDLC SERPL CALC-MCNC: 17.6 MG/DL
WBC # BLD AUTO: 6 K/UL (ref 4.6–13.2)

## 2020-11-19 PROCEDURE — 85027 COMPLETE CBC AUTOMATED: CPT

## 2020-11-19 PROCEDURE — 80061 LIPID PANEL: CPT

## 2020-11-19 PROCEDURE — 80048 BASIC METABOLIC PNL TOTAL CA: CPT

## 2020-11-19 PROCEDURE — 36415 COLL VENOUS BLD VENIPUNCTURE: CPT

## 2020-11-25 ENCOUNTER — TELEPHONE (OUTPATIENT)
Dept: FAMILY MEDICINE CLINIC | Age: 40
End: 2020-11-25

## 2020-11-25 DIAGNOSIS — R11.2 NAUSEA & VOMITING: ICD-10-CM

## 2020-11-25 DIAGNOSIS — G43.019 INTRACTABLE MIGRAINE WITHOUT AURA AND WITHOUT STATUS MIGRAINOSUS: ICD-10-CM

## 2020-11-25 RX ORDER — ONDANSETRON 4 MG/1
4 TABLET, ORALLY DISINTEGRATING ORAL
Qty: 10 TAB | Refills: 0 | Status: SHIPPED | OUTPATIENT
Start: 2020-11-25

## 2020-11-25 RX ORDER — SUMATRIPTAN 50 MG/1
TABLET, FILM COATED ORAL
Qty: 5 TAB | Refills: 0 | Status: SHIPPED | OUTPATIENT
Start: 2020-11-25

## 2020-11-25 NOTE — TELEPHONE ENCOUNTER
Spoke with Olayinka Thomas whom stated that migraine headache onset on Sunday November 22,2020 and would like to know he Dr. Abel Baig can  write a cocktail that was written by Marlon Owens of  Imitrex and Zofran because this really help.  Please advise

## 2020-11-25 NOTE — TELEPHONE ENCOUNTER
Pt called inquiring about the request and I advised that Dr Virginia Hidalgo had sent the medications to the pharmacy. He said thank you very much.  Closing encounter

## 2020-11-25 NOTE — TELEPHONE ENCOUNTER
Pt states that he has a migraine for  and was given a migraine cocktail from [facility] of  Imitrex and Zofran and he would like to know if Dr Rosita Parra would order this for him too. Please advise.  Thank you

## 2021-03-31 ENCOUNTER — TELEPHONE (OUTPATIENT)
Dept: FAMILY MEDICINE CLINIC | Age: 41
End: 2021-03-31

## 2021-03-31 NOTE — TELEPHONE ENCOUNTER
----- Message from Serafin Bustillo LPN sent at 3/15/1856  8:17 AM EDT -----  Regarding: FW: Non-Urgent Medical Question  Contact: [phone]    ----- Message -----  From: Graeme Smith  Sent: 3/30/2021   5:42 PM EDT  To: [facility] Nurse [place]  Subject: Non-Urgent Ted Lunsford recently tested positive for [place] last Thursday. It's been rough. I pretty much had the spectrum of symptoms which includes fever, chills, headaches, nauseous, coughs, diarrhea, body aches and my eyes have been sore. I'm trying to fight thru this by taking ibuprofen, Tylenol, and vitamins. It gives me temporary relief. However, I now have this red rash on my thighs. It doesn't itch but I was concerned. It is something I should be concerned about? Also, is there anything else I can take to help fight COVID? Thank you for your guidance.     Noe Jauregui

## 2021-03-31 NOTE — TELEPHONE ENCOUNTER
Tingling resolved after motrin. Monitor for now. Supportive care of fever with tylenol and motrin. No CP or dyspnea reported.

## 2021-11-15 ENCOUNTER — HOSPITAL ENCOUNTER (OUTPATIENT)
Dept: LAB | Age: 41
Discharge: HOME | End: 2021-11-15
Payer: COMMERCIAL

## 2021-11-15 ENCOUNTER — OFFICE VISIT (OUTPATIENT)
Dept: FAMILY MEDICINE CLINIC | Age: 41
End: 2021-11-15
Payer: COMMERCIAL

## 2021-11-15 ENCOUNTER — HOSPITAL ENCOUNTER (OUTPATIENT)
Dept: GENERAL RADIOLOGY | Age: 41
Discharge: HOME | End: 2021-11-15
Payer: COMMERCIAL

## 2021-11-15 VITALS
DIASTOLIC BLOOD PRESSURE: 68 MMHG | HEART RATE: 56 BPM | SYSTOLIC BLOOD PRESSURE: 114 MMHG | RESPIRATION RATE: 14 BRPM | BODY MASS INDEX: 26.22 KG/M2 | OXYGEN SATURATION: 98 % | WEIGHT: 177 LBS | TEMPERATURE: 98.3 F | HEIGHT: 69 IN

## 2021-11-15 DIAGNOSIS — G47.33 OSA ON CPAP: ICD-10-CM

## 2021-11-15 DIAGNOSIS — R53.83 FATIGUE, UNSPECIFIED TYPE: ICD-10-CM

## 2021-11-15 DIAGNOSIS — M54.41 BILATERAL LOW BACK PAIN WITH BILATERAL SCIATICA, UNSPECIFIED CHRONICITY: ICD-10-CM

## 2021-11-15 DIAGNOSIS — Z99.89 OSA ON CPAP: ICD-10-CM

## 2021-11-15 DIAGNOSIS — Z00.00 WELL ADULT EXAM: ICD-10-CM

## 2021-11-15 DIAGNOSIS — M54.42 BILATERAL LOW BACK PAIN WITH BILATERAL SCIATICA, UNSPECIFIED CHRONICITY: ICD-10-CM

## 2021-11-15 DIAGNOSIS — Z00.00 WELL ADULT EXAM: Primary | ICD-10-CM

## 2021-11-15 DIAGNOSIS — U07.1 COVID-19: ICD-10-CM

## 2021-11-15 LAB
ALBUMIN SERPL-MCNC: 3.8 G/DL (ref 3.4–5)
ALBUMIN/GLOB SERPL: 1.2 {RATIO} (ref 0.8–1.7)
ALP SERPL-CCNC: 62 U/L (ref 45–117)
ALT SERPL-CCNC: 28 U/L (ref 16–61)
ANION GAP SERPL CALC-SCNC: 5 MMOL/L (ref 3–18)
AST SERPL-CCNC: 23 U/L (ref 10–38)
BASOPHILS # BLD: 0 K/UL (ref 0–0.1)
BASOPHILS NFR BLD: 1 % (ref 0–2)
BILIRUB SERPL-MCNC: 0.5 MG/DL (ref 0.2–1)
BUN SERPL-MCNC: 15 MG/DL (ref 7–18)
BUN/CREAT SERPL: 14 (ref 12–20)
CALCIUM SERPL-MCNC: 9.1 MG/DL (ref 8.5–10.1)
CHLORIDE SERPL-SCNC: 107 MMOL/L (ref 100–111)
CO2 SERPL-SCNC: 27 MMOL/L (ref 21–32)
CREAT SERPL-MCNC: 1.1 MG/DL (ref 0.6–1.3)
DIFFERENTIAL METHOD BLD: ABNORMAL
EOSINOPHIL # BLD: 0.2 K/UL (ref 0–0.4)
EOSINOPHIL NFR BLD: 4 % (ref 0–5)
ERYTHROCYTE [DISTWIDTH] IN BLOOD BY AUTOMATED COUNT: 12.3 % (ref 11.6–14.5)
GLOBULIN SER CALC-MCNC: 3.2 G/DL (ref 2–4)
GLUCOSE SERPL-MCNC: 94 MG/DL (ref 74–99)
HCT VFR BLD AUTO: 41.7 % (ref 36–48)
HGB BLD-MCNC: 14 G/DL (ref 13–16)
IMM GRANULOCYTES # BLD AUTO: 0 K/UL (ref 0–0.04)
IMM GRANULOCYTES NFR BLD AUTO: 0 % (ref 0–0.5)
LYMPHOCYTES # BLD: 1.6 K/UL (ref 0.9–3.6)
LYMPHOCYTES NFR BLD: 26 % (ref 21–52)
MCH RBC QN AUTO: 31.4 PG (ref 24–34)
MCHC RBC AUTO-ENTMCNC: 33.6 G/DL (ref 31–37)
MCV RBC AUTO: 93.5 FL (ref 78–100)
MONOCYTES # BLD: 0.5 K/UL (ref 0.05–1.2)
MONOCYTES NFR BLD: 8 % (ref 3–10)
NEUTS SEG # BLD: 3.9 K/UL (ref 1.8–8)
NEUTS SEG NFR BLD: 62 % (ref 40–73)
NRBC # BLD: 0 K/UL (ref 0–0.01)
NRBC BLD-RTO: 0 PER 100 WBC
PLATELET # BLD AUTO: 344 K/UL (ref 135–420)
PMV BLD AUTO: 8.6 FL (ref 9.2–11.8)
POTASSIUM SERPL-SCNC: 4.2 MMOL/L (ref 3.5–5.5)
PROT SERPL-MCNC: 7 G/DL (ref 6.4–8.2)
RBC # BLD AUTO: 4.46 M/UL (ref 4.35–5.65)
SODIUM SERPL-SCNC: 139 MMOL/L (ref 136–145)
TSH SERPL DL<=0.05 MIU/L-ACNC: 0.92 UIU/ML (ref 0.36–3.74)
WBC # BLD AUTO: 6.2 K/UL (ref 4.6–13.2)

## 2021-11-15 PROCEDURE — 36415 COLL VENOUS BLD VENIPUNCTURE: CPT

## 2021-11-15 PROCEDURE — 72110 X-RAY EXAM L-2 SPINE 4/>VWS: CPT

## 2021-11-15 PROCEDURE — 99396 PREV VISIT EST AGE 40-64: CPT | Performed by: FAMILY MEDICINE

## 2021-11-15 PROCEDURE — 85025 COMPLETE CBC W/AUTO DIFF WBC: CPT

## 2021-11-15 PROCEDURE — 84443 ASSAY THYROID STIM HORMONE: CPT

## 2021-11-15 PROCEDURE — 80053 COMPREHEN METABOLIC PANEL: CPT

## 2021-11-15 NOTE — PATIENT INSTRUCTIONS
Well Visit, Ages 25 to 48: Care Instructions  Overview     Well visits can help you stay healthy. Your doctor has checked your overall health and may have suggested ways to take good care of yourself. Your doctor also may have recommended tests. At home, you can help prevent illness with healthy eating, regular exercise, and other steps. Follow-up care is a key part of your treatment and safety. Be sure to make and go to all appointments, and call your doctor if you are having problems. It's also a good idea to know your test results and keep a list of the medicines you take. How can you care for yourself at home? · Get screening tests that you and your doctor decide on. Screening helps find diseases before any symptoms appear. · Eat healthy foods. Choose fruits, vegetables, whole grains, protein, and low-fat dairy foods. Limit fat, especially saturated fat. Reduce salt in your diet. · Limit alcohol. If you are a man, have no more than 2 drinks a day or 14 drinks a week. If you are a woman, have no more than 1 drink a day or 7 drinks a week. · Get at least 30 minutes of physical activity on most days of the week. Walking is a good choice. You also may want to do other activities, such as running, swimming, cycling, or playing tennis or team sports. Discuss any changes in your exercise program with your doctor. · Reach and stay at a healthy weight. This will lower your risk for many problems, such as obesity, diabetes, heart disease, and high blood pressure. · Do not smoke or allow others to smoke around you. If you need help quitting, talk to your doctor about stop-smoking programs and medicines. These can increase your chances of quitting for good. · Care for your mental health. It is easy to get weighed down by worry and stress. Learn strategies to manage stress, like deep breathing and mindfulness, and stay connected with your family and community.  If you find you often feel sad or hopeless, talk with your doctor. Treatment can help. · Talk to your doctor about whether you have any risk factors for sexually transmitted infections (STIs). You can help prevent STIs if you wait to have sex with a new partner (or partners) until you've each been tested for STIs. It also helps if you use condoms (male or female condoms) and if you limit your sex partners to one person who only has sex with you. Vaccines are available for some STIs, such as HPV. · Use birth control if it's important to you to prevent pregnancy. Talk with your doctor about the choices available and what might be best for you. · If you think you may have a problem with alcohol or drug use, talk to your doctor. This includes prescription medicines (such as amphetamines and opioids) and illegal drugs (such as cocaine and methamphetamine). Your doctor can help you figure out what type of treatment is best for you. · Protect your skin from too much sun. When you're outdoors from 10 a.m. to 4 p.m., stay in the shade or cover up with clothing and a hat with a wide brim. Wear sunglasses that block UV rays. Even when it's cloudy, put broad-spectrum sunscreen (SPF 30 or higher) on any exposed skin. · See a dentist one or two times a year for checkups and to have your teeth cleaned. · Wear a seat belt in the car. When should you call for help? Watch closely for changes in your health, and be sure to contact your doctor if you have any problems or symptoms that concern you. Where can you learn more? Go to http://www.[employer].com/  Enter P072 in the search box to learn more about \"Well Visit, Ages 25 to 48: Care Instructions. \"  Current as of: February 11, 2021               Content Version: 13.0  © 2925-6693 [employer]wise, Incorporated. Care instructions adapted under license by [employer] (which disclaims liability or warranty for this information).  If you have questions about a medical condition or this instruction, always ask your healthcare professional. [place] [address] any warranty or liability for your use of this information.

## 2021-11-15 NOTE — PROGRESS NOTES
Chief Complaint   Patient presents with    Physical    Immunization/Injection     Subjective:   Karley Finney is a 42 y.o. male presenting for his annual checkup. ROS:  Complete 10 system ROS is obtained from the patient which is negative other than HPI    Specific concerns today:   Had covid in March. Still has low energy. Also developed low back pain after he got back to activity in May when he was getting back in the gym. Back pain is sometimes down into his buttocks and hamstrings bilaterally. He has tried chiropractic. He has had some relief but only temporarily. He has no weakness. Has not done formal PT. He is using CPAP seldom lately. He does have a ORIANA diagnosis. No Known Allergies  Past Medical History:   Diagnosis Date    COVID-19 03/2021    Blossom (type) I [facility]) atrioventricular block     saw cardiology, listed in A/P of their note    ORIANA on CPAP 2020     Past Surgical History:   Procedure Laterality Date    HX WISDOM TEETH EXTRACTION       Family History   Problem Relation Age of Onset    Diabetes Mother     Heart Disease Father         CABG in his 62s     Social History     Tobacco Use    Smoking status: Never Smoker    Smokeless tobacco: Never Used    Tobacco comment: Hookah axp 1 a month   Substance Use Topics    Alcohol use: Yes     Alcohol/week: 2.0 standard drinks     Types: 2 Glasses of wine per week     Comment: Occasionally      Objective:     Visit Vitals  /68 (BP 1 Location: Right arm, BP Patient Position: Sitting, BP Cuff Size: Adult)   Pulse (!) 56   Temp 98.3 °F (36.8 °C) (Temporal)   Resp 14   Ht 5' 9\" (1.753 m)   Wt 177 lb (80.3 kg)   SpO2 98%   BMI 26.14 kg/m²     The patient appears well, alert, oriented x 3, in no distress. ENT normal with the exception of mild nasal turbinates being engorged and posterior pharyngeal cobblestoning. Neck supple. No adenopathy or thyromegaly. ARTHUR.  Lungs are clear, good air entry, no wheezes, rhonchi or rales. S1 and S2 normal, no murmurs, regular rate but abnormal rhythm. Abdomen is soft without tenderness, guarding, mass or organomegaly. Back:  Rigid paralumbar muscles. Full ROM. No midline tenderness. Extremities show no edema. Neurological is normal without focal findings. Psych: normal affect. Mood good. Oriented x 3. Judgement and insight intact. Assessment/Plan:       ICD-10-CM ICD-9-CM    1. Well adult exam  Z00.00 V70.0 CBC WITH AUTOMATED DIFF      METABOLIC PANEL, COMPREHENSIVE   2. ORIANA on CPAP  G47.33 327.23     Z99.89 V46.8    3. Fatigue, unspecified type  R53.83 780.79 TSH 3RD GENERATION   4. COVID-19  U07.1 079.89    5. Bilateral low back pain with bilateral sciatica, unspecified chronicity  M54.42 724.3 REFERRAL TO PHYSICAL THERAPY    M54.41 724.2 XR SPINE LUMB MIN 4 V     Age and sex specific counseling. Work-up for post-covid symptoms. X-rays of low back. Refer for formal PT. Refer to Dr. Mari Slater if not better. RTC for flu vaccine when we get restocked this week or next. All chart history elements were reviewed by me at the time of the visit even though marked at time of note closure. Patient understands our medical plan. Patient has provided input and agrees with goals. Alternatives have been explained and offered. All questions answered. The patient is to call if condition worsens or fails to improve. Follow-up and Dispositions    · Return in about 1 year (around 11/15/2022).

## 2021-11-15 NOTE — PROGRESS NOTES
1. \"Have you been to the ER, urgent care clinic since your last visit? Hospitalized since your last visit? \" No    2. \"Have you seen or consulted any other health care providers outside of the [address] [place] [address] since your last visit? \" No     3. For patients aged 39-70: Has the patient had a colonoscopy?  N/a

## 2021-11-16 RX ORDER — MELOXICAM 15 MG/1
15 TABLET ORAL DAILY
Qty: 30 TABLET | Refills: 0 | Status: SHIPPED | OUTPATIENT
Start: 2021-11-16

## 2021-11-19 ENCOUNTER — HOSPITAL ENCOUNTER (OUTPATIENT)
Dept: PHYSICAL THERAPY | Age: 41
Discharge: HOME | End: 2021-11-19
Payer: COMMERCIAL

## 2021-11-19 PROCEDURE — 97530 THERAPEUTIC ACTIVITIES: CPT

## 2021-11-19 PROCEDURE — 97110 THERAPEUTIC EXERCISES: CPT

## 2021-11-19 PROCEDURE — 97161 PT EVAL LOW COMPLEX 20 MIN: CPT

## 2021-11-19 NOTE — PROGRESS NOTES
PT DAILY TREATMENT NOTE     Patient Name: Graeme Smith  Date:2021  : 1980  [x]  Patient  Verified  Payor: Yelitza Conde / Plan: Ericka Moser PPO / Product Type: PPO /    In time: 9:17  Out time:10:00  Total Treatment Time (min): 43  Visit #: 1 of 8    Treatment Area: Radiculopathy, thoracic region [M54.14]  Lumbago with sciatica, left side [M54.42]    SUBJECTIVE  Pain Level (0-10 scale): 2/10  Any medication changes, allergies to medications, adverse drug reactions, diagnosis change, or new procedure performed?: [x] No    [] Yes (see summary sheet for update)  Subjective functional status/changes:   [] No changes reported  The patient has a chief complaint of LBP with limitations of sitting and lifting. OBJECTIVE  20 min -Eval                  -Re-Eval       15 min Therapeutic Exercise:  [x] See flow sheet :   Rationale: increase ROM and increase strength to improve the patients ability to improve ADL ease. 8 min Therapeutic Activity:  []  See flow sheet : Education regarding lumbar support use in seated position in both the car and during work (pt sits for work) using a towel roll and altering the number of rolls in order to accommodate the particular chair. Rationale: increase ROM and increase strength  to improve the patients ability to improve ADL ease. With   [] TE   [] TA   [] neuro   [] other: Patient Education: [x] Review HEP    [] Progressed/Changed HEP based on:   [] positioning   [] body mechanics   [] transfers   [] heat/ice application    [] other:      Other Objective/Functional Measures: See IE     Pain Level (0-10 scale) post treatment: 2/10    ASSESSMENT/Changes in Function: See POC.      Patient will continue to benefit from skilled PT services to modify and progress therapeutic interventions, address functional mobility deficits, address ROM deficits, address strength deficits, analyze and address soft tissue restrictions, analyze and cue movement patterns, analyze and modify body mechanics/ergonomics, assess and modify postural abnormalities and instruct in home and community integration to attain remaining goals. []  See Plan of Care  []  See progress note/recertification  []  See Discharge Summary         Progress towards goals / Updated goals:  Short Term Goals: To be accomplished in 2 weeks:               1. The patient will demonstrate independence and compliance of HEP to maximize therapeutic benefit. IE: issued HEP               2. The patient will improve lumbar extension to WNL to improve ease of performing lifts with proper form. IE: WNL  Long Term Goals: To be accomplished in 4 weeks:               1. The patient will improve FOTO score to 82 to improve ease of ADLs. IE: 80               2. The patient will improve hip ABD/EXT to 5/5 MMT to maximize stability in stance. IE: 4/5 MMT ext/ABD B               3. The patient will report 75% improvement regarding peripheralization of pain into hamstrings during sitting. IE 0%               4. The patient will will demonstrate single leg bridge void of pelvic drop to maximize core rotary stability during lifting.    IE: notable rotary compensation     PLAN  []  Upgrade activities as tolerated     [x]  Continue plan of care  []  Update interventions per flow sheet       []  Discharge due to:_  []  Other:_      Real Colby, PT 11/19/2021  10:16 AM    Future Appointments   Date Time Provider Mike Suárez   11/22/2021  7:00 AM Raquel Guardado [facility]   11/29/2021  9:15 AM Milly Grossman PTA [facility]   12/2/2021  9:15 AM Jonathan Rivera [facility]   12/6/2021  8:30 AM Raquel Guardado [facility]   12/9/2021  8:30 AM Milyl Grossman PTA [facility]   12/13/2021  8:30 AM Raquel Guardado [facility]   12/15/2021  7:45 AM Trent Houston PT [facility]

## 2021-11-19 NOTE — PROGRESS NOTES
[facility]  [address]  [place], [address]  [phone] fax    Plan of Care/ Statement of Necessity for Physical Therapy Services    Patient name: Hattie Harris Start of Care: 2021   Referral source: Kamla Rodriguez MD : 1980    Medical Diagnosis: Radiculopathy, thoracic region [M54.14]  Lumbago with sciatica, left side [M54.42]  Payor: Roc Nocona / Plan: [facility] [employer] PPO / Product Type: PPO /  Onset Date:Chronic LBP with exacerbation in     Treatment Diagnosis: LBP    Prior Hospitalization: see medical history Provider#: [id number]   Medications: Verified on Patient summary List    Comorbidities: None   Prior Level of Function: The patient reports that had improved ease of sitting and performing occupational tasks as well as lifting prior to onset. The Plan of Care and following information is based on the information from the initial evaluation. Assessment/ key information: The patient is a 42year old male with a chief complaint of low back pain that is worse first thing in the morning and after prolonged periods of sitting, or with lifting. He states his pain became worse after he was bedridden with COVID for a few weeks in March. He states following this time, he has continued to experience back pain with chief complaints as listed above. He does indicate he will get referral into his glutes and hamstrings in a seated position, but typically resolves with standing. The patient presents with limited PA mobility upon assessment of lumbar spine, and notable limitation of lumbar extension ROM. He has been instructed on use of a towel roll for lumbar support in sitting.  He presents with signs and symptoms consistent with mechanical low back pain with an apparent lumbar extension bias with impairments consisting of pain, decreased ROM, decreased flexibility, decreased strength, limited ADL ease, and decreased quality of life. The patient will benefit from skilled PT in order to address the aforementioned impairments. Evaluation Complexity History LOW Complexity : Zero comorbidities / personal factors that will impact the outcome / POC; Examination LOW Complexity : 1-2 Standardized tests and measures addressing body structure, function, activity limitation and / or participation in recreation  ;Presentation LOW Complexity : Stable, uncomplicated  ;Clinical Decision Making LOW Complexity : FOTO score of   Overall Complexity Rating: LOW   Problem List: pain affecting function, decrease ROM, decrease strength, decrease ADL/ functional abilitiies, decrease activity tolerance and decrease flexibility/ joint mobility   Treatment Plan may include any combination of the following: Therapeutic exercise, Therapeutic activities, Neuromuscular re-education, Physical agent/modality, Manual therapy, Patient education, Self Care training, Functional mobility training and Home safety training  Patient / Family readiness to learn indicated by: asking questions, trying to perform skills and interest  Persons(s) to be included in education: patient (P)  Barriers to Learning/Limitations: None  Patient Goal (s): no more lower back pain or improvement  Patient Self Reported Health Status: excellent  Rehabilitation Potential: excellent    Short Term Goals: To be accomplished in 2 weeks:   1. The patient will demonstrate independence and compliance of HEP to maximize therapeutic benefit. 2. The patient will improve lumbar extension to WNL to improve ease of performing lifts with proper form. Long Term Goals: To be accomplished in 4 weeks:   1. The patient will improve FOTO score to 82 to improve ease of ADLs. 2. The patient will improve hip ABD/EXT to 5/5 MMT to maximize stability in stance. 3. The patient will report 75% improvement regarding peripheralization of pain into hamstrings during sitting.    4. The patient will will demonstrate single leg bridge void of pelvic drop to maximize core rotary stability during lifting. Frequency / Duration: Patient to be seen 2 times per week for 4 weeks. Patient/ Caregiver education and instruction: Diagnosis, prognosis, self care, activity modification and exercises   [x]  Plan of care has been reviewed with CASH Reyna, PT 11/19/2021 10:00 AM    ________________________________________________________________________    I certify that the above Therapy Services are being furnished while the patient is under my care. I agree with the treatment plan and certify that this therapy is necessary.     [address] [place] [address] Signature:____________Date:_________TIME:________     Samuel Ahumada MD  ** Signature, Date and Time must be completed for valid certification **    Please sign and return to [facility] [address] [facility] [address]  [place], [address]  [phone] fax

## 2021-11-22 ENCOUNTER — HOSPITAL ENCOUNTER (OUTPATIENT)
Dept: PHYSICAL THERAPY | Age: 41
Discharge: HOME | End: 2021-11-22
Payer: COMMERCIAL

## 2021-11-22 PROCEDURE — 97112 NEUROMUSCULAR REEDUCATION: CPT

## 2021-11-22 PROCEDURE — 97140 MANUAL THERAPY 1/> REGIONS: CPT

## 2021-11-22 PROCEDURE — 97110 THERAPEUTIC EXERCISES: CPT

## 2021-11-22 NOTE — PROGRESS NOTES
PT DAILY TREATMENT NOTE     Patient Name: Gallito Su  Date:2021  : 1980  [x]  Patient  Verified  Payor: Tamiko Pappas / Plan: Isaak Hawkins PPO / Product Type: PPO /    In time: 700  Out time: 744  Total Treatment Time (min): 44  Visit #: 2 of 8    Treatment Area: Radiculopathy, thoracic region [M54.14]  Lumbago with sciatica, left side [M54.42]    SUBJECTIVE  Pain Level (0-10 scale):1-2   Any medication changes, allergies to medications, adverse drug reactions, diagnosis change, or new procedure performed?: [x] No    [] Yes (see summary sheet for update)  Subjective functional status/changes:   [] No changes reported  Patient reports his back has not been \"too bad\" as he has been completing the HEP as prescribed. OBJECTIVE    24 min Therapeutic Exercise:  [x] See flow sheet :   Rationale: increase ROM, increase strength and improve coordination to improve the patients ability to perform ADLs and self care tasks with greater ease     12 min Neuromuscular Re-education:  [x]  See flow sheet : [place]leton chops/lifts, bridges on SB, core align squats with hip abduction    Rationale: increase strength, improve coordination and increase proprioception  to improve the patients ability to perform functional tasks with improved core stabilization     8 min Manual Therapy:  Prone - STM/DTM to left paraspinals, TPR to left QL, grade II/III lumbar PA mobilizations    The manual therapy interventions were performed at a separate and distinct time from the therapeutic activities interventions.   Rationale: decrease pain, increase ROM and increase tissue extensibility to perform functional tasks with greater ease           With   [] TE   [] TA   [] neuro   [] other: Patient Education: [x] Review HEP    [] Progressed/Changed HEP based on:   [] positioning   [] body mechanics   [] transfers   [] heat/ice application    [] other:      Other Objective/Functional Measures: exercises initiated per flow sheet      Pain Level (0-10 scale) post treatment: 0 - at rest    ASSESSMENT/Changes in Function: Patient presents for first follow up visit since initial evaluation. Patient putting forth good effort throughout session. Patient initially guarding significantly with lumbar PA mobilizations, however improved followed STM/DTM to lumbar paraspinals and followed by mobilizations again. Patient quite limited into extension noted on spine corrector. Patient challenged with maintaining neutral spine with quadruped series initially, however with TC is able to correct and maintain. Patient reporting \"fatigue\" in the back with exercises, although denies pain at end of session at rest.     Patient will continue to benefit from skilled PT services to modify and progress therapeutic interventions, address functional mobility deficits, address ROM deficits, address strength deficits, analyze and address soft tissue restrictions, analyze and cue movement patterns, analyze and modify body mechanics/ergonomics, assess and modify postural abnormalities and instruct in home and community integration to attain remaining goals. []  See Plan of Care  []  See progress note/recertification  []  See Discharge Summary         Progress towards goals / Updated goals:  Short Term Goals: To be accomplished in 2 weeks:               1. The patient will demonstrate independence and compliance of HEP to maximize therapeutic benefit. IE: issued HEP   Current: met 11/22/2021 - reports compliance                2. The patient will improve lumbar extension to WNL to improve ease of performing lifts with proper form. IE: WNL  Long Term Goals: To be accomplished in 4 weeks:               1. The patient will improve FOTO score to 82 to improve ease of ADLs. IE: 80               2. The patient will improve hip ABD/EXT to 5/5 MMT to maximize stability in stance. IE: 4/5 MMT ext/ABD B               3. The patient will report 75% improvement regarding peripheralization of pain into hamstrings during sitting. IE 0%               4. The patient will will demonstrate single leg bridge void of pelvic drop to maximize core rotary stability during lifting.               IE: notable rotary compensation    PLAN  []  Upgrade activities as tolerated     [x]  Continue plan of care  []  Update interventions per flow sheet       []  Discharge due to:_  []  Other:_      Aniya Riddle, PT, DPT  11/22/2021  7:07 AM    Future Appointments   Date Time Provider Mike Suárez   11/29/2021  9:15 AM Clarissa Sheffield PTA [facility]   12/2/2021  9:15 AM Brenda Gupta [facility]   12/6/2021  8:30 AM Racquel Pino [facility]   12/9/2021  8:30 AM Clarissa Sheffield PTA [facility]   12/13/2021  8:30 AM Racquelngoc Castilloch [facility]   12/15/2021  7:45 AM Alona Houston, PT [facility]

## 2021-11-29 ENCOUNTER — HOSPITAL ENCOUNTER (OUTPATIENT)
Dept: PHYSICAL THERAPY | Age: 41
Discharge: HOME | End: 2021-11-29
Payer: COMMERCIAL

## 2021-11-29 PROCEDURE — 97140 MANUAL THERAPY 1/> REGIONS: CPT

## 2021-11-29 PROCEDURE — 97112 NEUROMUSCULAR REEDUCATION: CPT

## 2021-11-29 PROCEDURE — 97110 THERAPEUTIC EXERCISES: CPT

## 2021-11-29 NOTE — PROGRESS NOTES
PT DAILY TREATMENT NOTE     Patient Name: Luke Barajas  Date:2021  : 1980  [x]  Patient  Verified  Payor: Rajan Milly / Plan: Lydia Salazar PPO / Product Type: PPO /    In time:9:16  Out time:9:56  Total Treatment Time (min): 40  Visit #: 3 of 8    Treatment Area: Radiculopathy, thoracic region [M54.14]  Lumbago with sciatica, left side [M54.42]    SUBJECTIVE  Pain Level (0-10 scale): 3  Any medication changes, allergies to medications, adverse drug reactions, diagnosis change, or new procedure performed?: [x] No    [] Yes (see summary sheet for update)  Subjective functional status/changes:   [] No changes reported  Pt reports 6/10 pain when he woke up this morning. He slept on a pull out couch and that really messed up his back. OBJECTIVE    20 min Therapeutic Exercise:  [x] See flow sheet :   Rationale: increase ROM and increase strength to improve the patients ability to perform ADLs    12 min Neuromuscular Re-education:  [x]  See flow sheet :   Rationale: increase strength, improve coordination and increase proprioception  to improve the patients ability to perform functional tasks    8 min Manual Therapy:  STM/DTM to piero lumbar paraspinals, grade 2-3 lumbar PA mobs   The manual therapy interventions were performed at a separate and distinct time from the therapeutic activities interventions. Rationale: decrease pain, increase ROM and increase tissue extensibility to improve functional mobility           With   [] TE   [] TA   [] neuro   [] other: Patient Education: [x] Review HEP    [] Progressed/Changed HEP based on:   [] positioning   [] body mechanics   [] transfers   [] heat/ice application    [] other:      Other Objective/Functional Measures:      Pain Level (0-10 scale) post treatment: 2    ASSESSMENT/Changes in Function: Pt cont's to be challenged with bridges. Performed prone press up with therapist OP.  Initially reports incr'd discomfort with spine corrector but improves with repetition. Decr'd pain following treatment today. Patient will continue to benefit from skilled PT services to modify and progress therapeutic interventions, address functional mobility deficits, address ROM deficits, address strength deficits, analyze and address soft tissue restrictions, analyze and cue movement patterns, analyze and modify body mechanics/ergonomics and assess and modify postural abnormalities to attain remaining goals. []  See Plan of Care  []  See progress note/recertification  []  See Discharge Summary         Progress towards goals / Updated goals:  Short Term Goals: To be accomplished in 2 weeks:               1. The patient will demonstrate independence and compliance of HEP to maximize therapeutic benefit.              IE: issued HEP              Current: met 11/22/2021 - reports compliance                2. The patient will improve lumbar extension to WNL to improve ease of performing lifts with proper form.              IE: WNL  Long Term Goals: To be accomplished in 4 weeks:               1. The patient will improve FOTO score to 82 to improve ease of ADLs.                 US: 16               7. The patient will improve hip ABD/EXT to 5/5 MMT to maximize stability in stance.              IE: 4/5 MMT ext/ABD B               3. The patient will report 75% improvement regarding peripheralization of pain into hamstrings during sitting.              IE 0%               4.  The patient will will demonstrate single leg bridge void of pelvic drop to maximize core rotary stability during lifting.              IE: notable rotary compensation    PLAN  []  Upgrade activities as tolerated     [x]  Continue plan of care  []  Update interventions per flow sheet       []  Discharge due to:_  []  Other:_      Raquel Calle PTA 11/29/2021  9:09 AM    Future Appointments   Date Time Provider Mike Suárez   11/29/2021  9:15 AM Queen Alice PTA [facility]   12/2/2021  9:15 AM Fleeta Lapidus [facility]   12/6/2021  8:30 AM Fleeta Carlos Aidus [facility]   12/9/2021  8:30 AM Nikki Owens, PTA [facility]   12/13/2021  8:30 AM Candy Leonardo [facility]   12/15/2021  7:45 AM Melanie Houston, PT [facility]

## 2021-12-02 ENCOUNTER — HOSPITAL ENCOUNTER (OUTPATIENT)
Dept: PHYSICAL THERAPY | Age: 41
Discharge: HOME | End: 2021-12-02
Payer: COMMERCIAL

## 2021-12-02 PROCEDURE — 97140 MANUAL THERAPY 1/> REGIONS: CPT

## 2021-12-02 PROCEDURE — 97110 THERAPEUTIC EXERCISES: CPT

## 2021-12-02 PROCEDURE — 97112 NEUROMUSCULAR REEDUCATION: CPT

## 2021-12-02 NOTE — PROGRESS NOTES
PT DAILY TREATMENT NOTE     Patient Name: Van Baptiste  Date:2021  : 1980  [x]  Patient  Verified  Payor: Flex Ott / Plan: Daniele Fischer PPO / Product Type: PPO /    In time: 916  Out time: 117  Total Treatment Time (min): 42  Visit #: 4 of 8    Treatment Area: Radiculopathy, thoracic region [M54.14]  Lumbago with sciatica, left side [M54.42]    SUBJECTIVE  Pain Level (0-10 scale): 3  Any medication changes, allergies to medications, adverse drug reactions, diagnosis change, or new procedure performed?: [x] No    [] Yes (see summary sheet for update)  Subjective functional status/changes:   [] No changes reported  Patient reports he feels things are getting better. He reports pain is no longer waking him up at night. OBJECTIVE    21 min Therapeutic Exercise:  [x] See flow sheet :   Rationale: increase ROM, increase strength and improve coordination to improve the patients ability to perform ADLs and self care tasks with greater ease     13 min Neuromuscular Re-education:  [x]  See flow sheet : quadruped series, core align abduction with squats, SB bridge and bridge with marching    Rationale: increase strength, improve coordination and increase proprioception  to improve the patients ability to perform functional tasks with improved stabilization and control     8 min Manual Therapy:  Prone - grade III PA mobilizations T12-L5; STM/DTM to bilateral lumbar paraspinals    The manual therapy interventions were performed at a separate and distinct time from the therapeutic activities interventions.   Rationale: decrease pain, increase ROM and increase tissue extensibility to perform functional tasks with improved ease           With   [] TE   [] TA   [] neuro   [] other: Patient Education: [x] Review HEP    [] Progressed/Changed HEP based on:   [] positioning   [] body mechanics   [] transfers   [] heat/ice application    [] other:      Other Objective/Functional Measures: added reverse bridge marches with SB; increased weight with chops and lifts with good tolerance       Pain Level (0-10 scale) post treatment: 1-2    ASSESSMENT/Changes in Function: Overall, patient's tolerance to exercises have improved with each session. Also note improved lumbar extension to [facility] this visit achieving STG 2. Patient continues to be challenged with bridges and quite challenged with introduction of bridge and marching requiring constant VC and TC to maintain full bridge and glute/core activation. Continue progressing core stabilization and LE strengthening as able to improve ease with daily activities. Patient will continue to benefit from skilled PT services to modify and progress therapeutic interventions and address ROM deficits to attain remaining goals. []  See Plan of Care  []  See progress note/recertification  []  See Discharge Summary         Progress towards goals / Updated goals:  Short Term Goals: To be accomplished in 2 weeks:               1. The patient will demonstrate independence and compliance of HEP to maximize therapeutic benefit.              IE: issued HEP              RVCCOOD: met 11/22/2021 - reports compliance                2. The patient will improve lumbar extension to WNL to improve ease of performing lifts with proper form.              IE: 50%    Current: met 12/2/2021 - within normal limits   Long Term Goals: To be accomplished in 4 weeks:               1. The patient will improve FOTO score to 82 to improve ease of ADLs.                 MD: 72               6. The patient will improve hip ABD/EXT to 5/5 MMT to maximize stability in stance.              IE: 4/5 MMT ext/ABD B               3. The patient will report 75% improvement regarding peripheralization of pain into hamstrings during sitting.              IE 0%               4.  The patient will will demonstrate single leg bridge void of pelvic drop to maximize core rotary stability during lifting.              IE: notable rotary compensation    PLAN  [x]  Upgrade activities as tolerated     []  Continue plan of care  []  Update interventions per flow sheet       []  Discharge due to:_  []  Other:_      Lolis Palomares, PT, DPT 12/2/2021  9:18 AM    Future Appointments   Date Time Provider Mike Suárez   12/6/2021  8:30 AM Karolina Son [facility]   12/9/2021  8:30 AM Nadiya Whitmore PTA [facility]   12/13/2021  8:30 AM Karolina Son [facility]   12/15/2021  7:45 AM Leobardo Houston, PT [facility]

## 2021-12-06 ENCOUNTER — HOSPITAL ENCOUNTER (OUTPATIENT)
Dept: PHYSICAL THERAPY | Age: 41
Discharge: HOME | End: 2021-12-06
Payer: COMMERCIAL

## 2021-12-06 PROCEDURE — 97110 THERAPEUTIC EXERCISES: CPT

## 2021-12-06 PROCEDURE — 97112 NEUROMUSCULAR REEDUCATION: CPT

## 2021-12-06 PROCEDURE — 97140 MANUAL THERAPY 1/> REGIONS: CPT

## 2021-12-06 NOTE — PROGRESS NOTES
PT DAILY TREATMENT NOTE     Patient Name: Derick Rod  Date:2021  : 1980  [x]  Patient  Verified  Payor: Lashaun Chauhan / Plan: Ronnie Enciso PPO / Product Type: PPO /    In time: 831 Out time: 759  Total Treatment Time (min): 44  Visit #: 5 of 8    Treatment Area: Radiculopathy, thoracic region [M54.14]  Lumbago with sciatica, left side [M54.42]    SUBJECTIVE  Pain Level (0-10 scale): 4-5  Any medication changes, allergies to medications, adverse drug reactions, diagnosis change, or new procedure performed?: [x] No    [] Yes (see summary sheet for update)  Subjective functional status/changes:   [] No changes reported  He reports doing a lot of driving this weekend (~6 hours) and that has caused his back to flare up. He meant to bring a towel roll to help support the back with prolonged sitting but forgot. OBJECTIVE    21 min Therapeutic Exercise:  [x] See flow sheet :   Rationale: increase ROM, increase strength and improve coordination to improve the patients ability to perform ADLs and self care tasks with greater ease     15 min Neuromuscular Re-education:  [x]  See flow sheet : quadruped series, SB stabilization exercises, core align series, RDLs    Rationale: increase strength, improve coordination and increase proprioception  to improve the patients ability to perform functional tasks with greater stabilization and control     8 min Manual Therapy:  Prone - grade III PA mobilization to lumbar spine and lower throacic spine; STM/DTM to left paraspinals and QL   The manual therapy interventions were performed at a separate and distinct time from the therapeutic activities interventions.   Rationale: decrease pain, increase ROM and increase tissue extensibility to perform functional tasks with greater ease           With   [] TE   [] TA   [] neuro   [] other: Patient Education: [x] Review HEP    [] Progressed/Changed HEP based on:   [] positioning   [] body mechanics   [] transfers   [] heat/ice application    [] other:      Other Objective/Functional Measures: added stir the pot with [place] machine today 8# for further core engagement, increased resistance with core align abduction with squat, progressed to single leg RDL        Pain Level (0-10 scale) post treatment: 1    ASSESSMENT/Changes in Function: Improved form noted with quadruped series and recommend progression to shuttle press next visit. Continued rotary motion at pelvic with single leg bridges, but improving. Good tolerance to addition of \"stir the pot\" in standing with [place] to further improve core stabilization. Challenged with balance on progression to single leg RDLs today, however able to keep proper core engagement with progression. No adverse reactions noted to increased repetitions today. Patient will continue to benefit from skilled PT services to modify and progress therapeutic interventions, address functional mobility deficits, address ROM deficits, address strength deficits, analyze and address soft tissue restrictions, analyze and cue movement patterns, analyze and modify body mechanics/ergonomics, assess and modify postural abnormalities and instruct in home and community integration to attain remaining goals. []  See Plan of Care  []  See progress note/recertification  []  See Discharge Summary         Progress towards goals / Updated goals:  Short Term Goals: To be accomplished in 2 weeks:               1. The patient will demonstrate independence and compliance of HEP to maximize therapeutic benefit.              IE: issued HEP              CDUPKAL: met 11/22/2021 - reports compliance                2. The patient will improve lumbar extension to WNL to improve ease of performing lifts with proper form.              IE: 50%               Current: met 12/2/2021 - within normal limits   Long Term Goals: To be accomplished in 4 weeks:               1.  The patient will improve FOTO score to 82 to improve ease of ADLs.                 PV: 31               1. The patient will improve hip ABD/EXT to 5/5 MMT to maximize stability in stance.              IE: 4/5 MMT ext/ABD B               3. The patient will report 75% improvement regarding peripheralization of pain into hamstrings during sitting.              IE 0%               4.  The patient will will demonstrate single leg bridge void of pelvic drop to maximize core rotary stability during lifting.              IE: notable rotary compensation   Current: continued rotary motion, however progressing with each visit     PLAN  []  Upgrade activities as tolerated     [x]  Continue plan of care  []  Update interventions per flow sheet       []  Discharge due to:_  []  Other:_      [place] Christian, PT, DPT 12/6/2021  8:34 AM    Future Appointments   Date Time Provider Mike Kamini   12/9/2021  8:30 [facility]   12/13/2021  8:30 AM Karla Calderon [facility]   12/15/2021  7:45 AM [facility] Houston[facility]

## 2021-12-09 ENCOUNTER — HOSPITAL ENCOUNTER (OUTPATIENT)
Dept: PHYSICAL THERAPY | Age: 41
Discharge: HOME | End: 2021-12-09
Payer: COMMERCIAL

## 2021-12-09 PROCEDURE — 97110 THERAPEUTIC EXERCISES: CPT

## 2021-12-09 PROCEDURE — 97140 MANUAL THERAPY 1/> REGIONS: CPT

## 2021-12-09 PROCEDURE — 97112 NEUROMUSCULAR REEDUCATION: CPT

## 2021-12-09 NOTE — PROGRESS NOTES
PT DAILY TREATMENT NOTE     Patient Name: Marilia Clark  Date:2021  : 1980  [x]  Patient  Verified  Payor: Germán Limb / Plan: Christiano Cueva PPO / Product Type: PPO /    In time:8:30  Out time:9:18  Total Treatment Time (min): 48  Visit #: 6 of 8    Treatment Area: Radiculopathy, thoracic region [M54.14]  Lumbago with sciatica, left side [M54.42]    SUBJECTIVE  Pain Level (0-10 scale): 2  Any medication changes, allergies to medications, adverse drug reactions, diagnosis change, or new procedure performed?: [x] No    [] Yes (see summary sheet for update)  Subjective functional status/changes:   [] No changes reported  Pt reports feeling a lot better, doesn't have pain as much when he wakes up    OBJECTIVE    25 min Therapeutic Exercise:  [x] See flow sheet :   Rationale: increase ROM and increase strength to improve the patients ability to perform ADLs    15 min Neuromuscular Re-education:  [x]  See flow sheet :   Rationale: increase strength, improve coordination and increase proprioception  to improve the patients ability to perform functional tasks    8 min Manual Therapy:  Prone grade 3 PA mob to L/s and lower T/s. STM to left paraspinals and QL   The manual therapy interventions were performed at a separate and distinct time from the therapeutic activities interventions. Rationale: decrease pain, increase ROM and increase tissue extensibility to improve functional mobility         With   [] TE   [] TA   [] neuro   [] other: Patient Education: [x] Review HEP    [] Progressed/Changed HEP based on:   [] positioning   [] body mechanics   [] transfers   [] heat/ice application    [] other:      Other Objective/Functional Measures: Added shuttle press per flow sheet     Pain Level (0-10 scale) post treatment: 2-3    ASSESSMENT/Changes in Function: Good tolerance to added shuttle press, vc's to maintain TA activation and avoid lumbar lordosis.  Greatly challenge with SL RDL on right, verbal, visual, and tactile cues to avoid hip rot. Pt edu to begin with small range ADL and progress if able to maintain correct form. Patient will continue to benefit from skilled PT services to modify and progress therapeutic interventions, address functional mobility deficits, address ROM deficits, address strength deficits, analyze and address soft tissue restrictions, analyze and cue movement patterns, analyze and modify body mechanics/ergonomics and assess and modify postural abnormalities to attain remaining goals. []  See Plan of Care  []  See progress note/recertification  []  See Discharge Summary         Progress towards goals / Updated goals:  Short Term Goals: To be accomplished in 2 weeks:               1. The patient will demonstrate independence and compliance of HEP to maximize therapeutic benefit.              IE: issued HEP              WZTNURS: met 11/22/2021 - reports compliance                2. The patient will improve lumbar extension to WNL to improve ease of performing lifts with proper form.              IE: 50%               Current: met 12/2/2021 - within normal limits   Long Term Goals: To be accomplished in 4 weeks:               1. The patient will improve FOTO score to 82 to improve ease of ADLs.                 NK: 50               4. The patient will improve hip ABD/EXT to 5/5 MMT to maximize stability in stance.              IE: 4/5 MMT ext/ABD B               3. The patient will report 75% improvement regarding peripheralization of pain into hamstrings during sitting.              IE 0%               4.  The patient will will demonstrate single leg bridge void of pelvic drop to maximize core rotary stability during lifting.              IE: notable rotary compensation              Current: continued rotary motion, however progressing with each visit     PLAN  []  Upgrade activities as tolerated     [x]  Continue plan of care  []  Update interventions per flow sheet       [] Discharge due to:_  []  Other:_      Raquel Calle, CASH 12/9/2021  8:30 AM    Future Appointments   Date Time Provider Mike Suárez   12/13/2021  8:30 AM Tessa HERNANDEZ   12/15/2021  7:45 AM Georgia Houston Rm, PT [facility]

## 2021-12-13 ENCOUNTER — HOSPITAL ENCOUNTER (OUTPATIENT)
Dept: PHYSICAL THERAPY | Age: 41
Discharge: HOME | End: 2021-12-13
Payer: COMMERCIAL

## 2021-12-13 PROCEDURE — 97110 THERAPEUTIC EXERCISES: CPT

## 2021-12-13 PROCEDURE — 97112 NEUROMUSCULAR REEDUCATION: CPT

## 2021-12-13 PROCEDURE — 97140 MANUAL THERAPY 1/> REGIONS: CPT

## 2021-12-13 NOTE — PROGRESS NOTES
PT DAILY TREATMENT NOTE     Patient Name: Martín Wayne  Date:2021  : 1980  [x]  Patient  Verified  Payor: Gemma Way / Plan: Eduardo Iqbal PPO / Product Type: PPO /    In time: 736  Out time:919  Total Treatment Time (min): 50  Visit #: 7 of 8    Treatment Area: Radiculopathy, thoracic region [M54.14]  Lumbago with sciatica, left side [M54.42]    SUBJECTIVE  Pain Level (0-10 scale): 3  Any medication changes, allergies to medications, adverse drug reactions, diagnosis change, or new procedure performed?: [x] No    [] Yes (see summary sheet for update)  Subjective functional status/changes:   [] No changes reported  Patient reports he was doing some housework this weekend and did pretty well with activities regarding back pain. He reports he has continued going to the gym ~3-4x/week and is completing with good tolerance. OBJECTIVE    25 min Therapeutic Exercise:  [x] See flow sheet :   Rationale: increase ROM, increase strength and improve coordination to improve the patients ability to perform ADLs and self care tasks with greater ease     17 min Neuromuscular Re-education:  [x]  See flow sheet : quadruped series, SB stabilization, [place] series    Rationale: increase strength, improve coordination, improve balance and increase proprioception  to improve the patients ability to perform functional tasks with greater stabilization and control     8 min Manual Therapy:  Prone - DTM to left QL and lumbar paraspinals; Grade III/IV PA mobilizations to lumbar spine    The manual therapy interventions were performed at a separate and distinct time from the therapeutic activities interventions.   Rationale: decrease pain, increase ROM and increase tissue extensibility to perform functional tasks with greater ease           With   [] TE   [] TA   [] neuro   [] other: Patient Education: [x] Review HEP    [] Progressed/Changed HEP based on:   [] positioning   [] body mechanics   [] transfers [] heat/ice application    [] other:      Other Objective/Functional Measures: right hip extension 5-/5, left hip extension 4+/5, bilateral hip abduction 5-/5     Increased resistance with chops/lifts, added javier extension with march, added francois stretch with strap   Pain Level (0-10 scale) post treatment: 1    ASSESSMENT/Changes in Function: Patient continues to progress weekly with increased resistance demonstrating proper form and control. Patient with difficulty maintaining neutral pelvis with reverse marches on SB, however has improved. Improved single leg RDL on left LE, however right LE continues to require VC and TC to avoid hip rotation. Patient feels he is benefiting from therapy regarding back pain and reports he no longer has difficulty with sleeping/waking up due to pain. Patient will continue to benefit from skilled PT services to modify and progress therapeutic interventions, address functional mobility deficits, address ROM deficits, address strength deficits, analyze and address soft tissue restrictions, analyze and cue movement patterns, analyze and modify body mechanics/ergonomics, assess and modify postural abnormalities and instruct in home and community integration to attain remaining goals. []  See Plan of Care  []  See progress note/recertification  []  See Discharge Summary         Progress towards goals / Updated goals:  Short Term Goals: To be accomplished in 2 weeks:               1. The patient will demonstrate independence and compliance of HEP to maximize therapeutic benefit.              IE: issued HEP              UTGNANA: Met 11/22/2021 - reports compliance                2. The patient will improve lumbar extension to WNL to improve ease of performing lifts with proper form.              IE: 50%               Current: Met 12/2/2021 - within normal limits   Long Term Goals: To be accomplished in 4 weeks:               1.  The patient will improve FOTO score to 82 to improve ease of ADLs.                 AW: 10               8. The patient will improve hip ABD/EXT to 5/5 MMT to maximize stability in stance.              IE: 4/5 MMT ext/ABD B   Current: progressing 12/13/2021 - right hip extension 5-/5, left hip extension 4+/5, bilateral hip abduction 5-/5                3. The patient will report 75% improvement regarding peripheralization of pain into hamstrings during sitting.              IE 0%   Current: Met 12/13/2021 - 80% improvement                4.  The patient will will demonstrate single leg bridge void of pelvic drop to maximize core rotary stability during lifting.              IE: notable rotary compensation              Current: continued rotary motion, however progressing with each visit (12/6/2021)    PLAN  []  Upgrade activities as tolerated     [x]  Continue plan of care  []  Update interventions per flow sheet       []  Discharge due to:_  []  Other:_      Alex Mccarthy PT, DPT 12/13/2021  8:31 AM    Future Appointments   Date Time Provider Mike Suárez   12/15/2021  7:45 AM [facility]

## 2021-12-15 ENCOUNTER — HOSPITAL ENCOUNTER (OUTPATIENT)
Dept: PHYSICAL THERAPY | Age: 41
Discharge: HOME | End: 2021-12-15
Payer: COMMERCIAL

## 2021-12-15 PROCEDURE — 97140 MANUAL THERAPY 1/> REGIONS: CPT

## 2021-12-15 PROCEDURE — 97112 NEUROMUSCULAR REEDUCATION: CPT

## 2021-12-15 PROCEDURE — 97110 THERAPEUTIC EXERCISES: CPT

## 2021-12-15 NOTE — PROGRESS NOTES
PT DAILY TREATMENT NOTE     Patient Name: Nic Amezquita  Date:12/15/2021  : 1980  [x]  Patient  Verified  Payor: Earline Gonzalez / Plan: Elliot Pelletier PPO / Product Type: PPO /    In time:7:45  Out time:8:34  Total Treatment Time (min): 49  Visit #: 8 of 8    Treatment Area: Radiculopathy, thoracic region [M54.14]  Lumbago with sciatica, left side [M54.42]    SUBJECTIVE  Pain Level (0-10 scale): 2/10  Any medication changes, allergies to medications, adverse drug reactions, diagnosis change, or new procedure performed?: [x] No    [] Yes (see summary sheet for update)  Subjective functional status/changes:   [] No changes reported  The patient reports that his back is doing better. He states that he has been able to sleep through the night and not wake up in as bad of pain in the morning. He indicates he notices better extension of his back too and can appreciate his progress. OBJECTIVE  26 min Therapeutic Exercise:  [x] See flow sheet :   Rationale: increase ROM and increase strength to improve the patients ability to improve ADL ease. 15 min Neuromuscular Re-education:  [x]  See flow sheet :   Rationale: increase ROM, increase strength and improve coordination  to improve the patients ability to improve ADL ease. 8 min Manual Therapy:  Lumbar PAs grade III to IV with the patient in prone, graston B thoracolumbar paraspinals in prayer stretch. The manual therapy interventions were performed at a separate and distinct time from the therapeutic activities interventions. Rationale: decrease pain, increase ROM and increase tissue extensibility to improve ADL ease. With   [] TE   [] TA   [] neuro   [] other: Patient Education: [x] Review HEP    [] Progressed/Changed HEP based on:   [] positioning   [] body mechanics   [] transfers   [] heat/ice application    [] other:      Other Objective/Functional Measures:   FOTO: 94    Pain Level (0-10 scale) post treatment: 0-1/10    ASSESSMENT/Changes in Function: Updated HEP with bands for quadruped LE and alternating with cues and practice in clinic as a part of his intervention today. The patient reports he does feel comfortable with D/C to day and self management, stating, \"I feel a lot better. \" The patient completed FOTO with a score of 94, indicating significant improvement in quality of life. THe patient has made excellent progress towards goals, and opts for D/C today due to feeling confident in self management. He leaves with all questions answered and in no apparent distress. []  See Plan of Care  []  See progress note/recertification  [x]  See Discharge Summary         Progress towards goals / Updated goals:  Short Term Goals: To be accomplished in 2 weeks:               1. The patient will demonstrate independence and compliance of HEP to maximize therapeutic benefit.              IE: issued HEP              [employer]: Met 11/22/2021 - reports compliance                2. The patient will improve lumbar extension to WNL to improve ease of performing lifts with proper form.              IE: 50%               Current: Met 12/2/2021 - within normal limits   Long Term Goals: To be accomplished in 4 weeks:               1. The patient will improve FOTO score to 82 to improve ease of ADLs.                 IE: 82   Current: Met - 94 12/15/2021               2. The patient will improve hip ABD/EXT to 5/5 MMT to maximize stability in stance.              IE: 4/5 MMT ext/ABD B              Current: progressing 12/13/2021 - right hip extension 5-/5, left hip extension 4+/5, bilateral hip abduction 5-/5                3. The patient will report 75% improvement regarding peripheralization of pain into hamstrings during sitting.              IE 0%              Current: Met 12/13/2021 - 80% improvement                4.  The patient will will demonstrate single leg bridge void of pelvic drop to maximize core rotary stability during lifting.              PS: notable rotary compensation              [id number]: continued rotary motion, however progressing with each visit (12/6/2021)    PLAN  []  Upgrade activities as tolerated     []  Continue plan of care  []  Update interventions per flow sheet       [x]  Discharge due to: good progress towards goals. []  Other:_      Luna Mason, PT 12/15/2021  7:48 AM    No future appointments.

## 2021-12-15 NOTE — PROGRESS NOTES
[facility]  [address]  [place], [address]  [phone] fax    Physical Therapy Discharge Summary  Patient name: Karley Finney Start of Care: 2021   Referral source: Kailey Leone MD : 1980                Medical Diagnosis: Radiculopathy, thoracic region [M54.14]  Lumbago with sciatica, left side [M54.42]  Payor: Libertad Valdes / [place] [facility] [place] [facility] / Product Type: PPO /  Onset Date:Chronic LBP with exacerbation in                Treatment Diagnosis: LBP    Prior Hospitalization: see medical history Provider#: [id number]   Medications: Verified on Patient summary List    Comorbidities: None   Prior Level of Function: The patient reports that had improved ease of sitting and performing occupational tasks as well as lifting prior to onset. Visits from Start of Care: 8    Missed Visits: 0  Reporting Period : 2021 to 12/15/2021    Summary of Care:  Short Term Goals: To be accomplished in 2 weeks:               1. The patient will demonstrate independence and compliance of HEP to maximize therapeutic benefit.              OW: issued HEP              Current: Met 2021 - reports compliance                2. The patient will improve lumbar extension to WNL to improve ease of performing lifts with proper form.              IE: 50%               Current: Met 2021 - within normal limits   Long Term Goals: To be accomplished in 4 weeks:               1. The patient will improve FOTO score to 82 to improve ease of ADLs.                 IE: 82              Current: Met - 94 12/15/2021               2. The patient will improve hip ABD/EXT to 5/5 MMT to maximize stability in stance.              IE: 4/5 MMT ext/ABD B              Current: progressing 2021 - right hip extension 5-/5, left hip extension 4+/5, bilateral hip abduction 5-/5                3.  The patient will report 75% improvement regarding peripheralization of pain into hamstrings during sitting.              IE 0%              Current: Met 12/13/2021 - 80% improvement                4. The patient will will demonstrate single leg bridge void of pelvic drop to maximize core rotary stability during lifting.              IE: notable rotary compensation              Current: continued rotary motion, however progressing with each visit (12/6/2021)    ASSESSMENT/RECOMMENDATIONS:  Updated HEP with bands for quadruped LE and alternating with cues and practice in clinic as a part of his intervention today. The patient reports he does feel comfortable with D/C to day and self management, stating, \"I feel a lot better. \" The patient completed FOTO with a score of 94, indicating significant improvement in quality of life. THe patient has made excellent progress towards goals, and opts for D/C today due to feeling confident in self management. He leaves with all questions answered and in no apparent distress.      [x]Discontinue therapy: []Patient has reached or is progressing toward set goals      []Patient is non-compliant or has abdicated      []Due to lack of appreciable progress towards set [address], PT 12/15/2021 10:22 AM

## 2022-02-14 ENCOUNTER — HOSPITAL ENCOUNTER (OUTPATIENT)
Dept: GENERAL RADIOLOGY | Age: 42
Discharge: HOME | End: 2022-02-14
Payer: COMMERCIAL

## 2022-02-14 ENCOUNTER — OFFICE VISIT (OUTPATIENT)
Dept: FAMILY MEDICINE CLINIC | Age: 42
End: 2022-02-14
Payer: COMMERCIAL

## 2022-02-14 VITALS
HEART RATE: 75 BPM | RESPIRATION RATE: 14 BRPM | DIASTOLIC BLOOD PRESSURE: 76 MMHG | OXYGEN SATURATION: 99 % | WEIGHT: 178 LBS | SYSTOLIC BLOOD PRESSURE: 110 MMHG | TEMPERATURE: 98.3 F | BODY MASS INDEX: 26.36 KG/M2 | HEIGHT: 69 IN

## 2022-02-14 DIAGNOSIS — M25.561 ACUTE PAIN OF RIGHT KNEE: ICD-10-CM

## 2022-02-14 DIAGNOSIS — M25.561 ACUTE PAIN OF RIGHT KNEE: Primary | ICD-10-CM

## 2022-02-14 PROCEDURE — 99212 OFFICE O/P EST SF 10 MIN: CPT | Performed by: FAMILY MEDICINE

## 2022-02-14 PROCEDURE — 73562 X-RAY EXAM OF KNEE 3: CPT

## 2022-02-14 RX ORDER — LEVOCETIRIZINE DIHYDROCHLORIDE 5 MG/1
5 TABLET, FILM COATED ORAL
COMMUNITY

## 2022-02-14 NOTE — PROGRESS NOTES
SUBJECTIVE  Chief Complaint   Patient presents with    Knee Pain     right knee      The patient presents complaining of right knee pain  Location: under the kneecap, within joint maybe, more medially  Quality: intermittent, sharp   Injury: denies but did start after a few [employer] races he thinks  Duration: few months   Radiation: denies  Swelling: no history of swelling after an event or recently  Instability: sometimes  Locking: denies   Aggravating factors: stairs  Relieving factors: neoprene knee sleeve  History of similar:  Denies   Has tried: asa  X-rays: not yet  PT: not yet    OBJECTIVE    Blood pressure 110/76, pulse 75, temperature 98.3 °F (36.8 °C), temperature source Temporal, resp. rate 14, height 5' 9\" (1.753 m), weight 178 lb (80.7 kg), SpO2 99 %. General:  alert, cooperative, well appearing, in no apparent distress. Musculoskeletal:  Gait is normal.   Right Knee: Inspection of the knee demonstrates there is no deformity. There is no effusion. There is no evidence of dislocation. There is normal flexion and extension of the bilateral knee. Assessment of ligamentous stability demonstrates the ACL/PCL/LCL/MCL are all intact. There is no pain with varus or valgus strain. Palpation demonstrates there is mild medial joint line tenderness. There is no tenderness of the pes anserine bursa. There is no tenderness along the tibial tuberosity or patellar tendon. There is no tenderness under the medial facet of the patella. There is no apprehension with patellar subluxation. There is no pain with rocking or grinding. Oscar's reproduces mild medial pain in the joint line. The bilateral knee demonstrates normal muscle tone with 5/5 strength with flexion extension. Skin:  There is no redness or warmth. No local rashes. There is no bruising. Psych: normal affect. Mood good. Oriented x 3. Judgement and insight intact. ASSESSMENT / PLAN    ICD-10-CM ICD-9-CM    1.  Acute pain of right knee  M25.561 719.46 XR KNEE RT 3 V     This sounds more patellofemoral in nature but he has mensicus signs on exam.  X-rays ordered. The patient was advised on activity modification, specifically with lunges and squatting. Patient was provided with a detailed home exercise program.      All chart history elements were reviewed by me at the time of the visit even though marked at time of note closure. Patient understands our medical plan. Patient has provided input and agrees with goals. Alternatives have been explained and offered. All questions answered. The patient is to call if condition worsens or fails to improve.      Follow-up and Dispositions    · Return end of November for a physical.

## 2022-02-14 NOTE — PROGRESS NOTES
1. \"Have you been to the ER, urgent care clinic since your last visit? Hospitalized since your last visit? \" No    2. \"Have you seen or consulted any other health care providers outside of the [address] [place] [address] since your last visit? \" No     3. For patients aged 39-70: Has the patient had a colonoscopy / FIT/ Cologuard? NA - based on age      If the patient is female:    4. For patients aged 41-77: Has the patient had a mammogram within the past 2 years? NA - based on age or sex      11. For patients aged 21-65: Has the patient had a pap smear?  NA - based on age or sex

## 2022-02-14 NOTE — PATIENT INSTRUCTIONS
Patellofemoral Pain Syndrome (Runner's Knee): Exercises  Introduction  Here are some examples of exercises for you to try. The exercises may be suggested for a condition or for rehabilitation. Start each exercise slowly. Ease off the exercises if you start to have pain. You will be told when to start these exercises and which ones will work best for you. How to do the exercises  Quad sets    1. Sit with your affected leg straight and supported on the floor or a firm bed. Place a small, rolled-up towel under your affected knee. Your other leg should be bent, with that foot flat on the floor. 2. Tighten the thigh muscles of your affected leg by pressing the back of your knee down into the towel. 3. Hold for about 6 seconds, then rest for up to 10 seconds. 4. Repeat 8 to 12 times. Straight-leg raises to the front    1. Lie on your back with your good knee bent so that your foot rests flat on the floor. Your affected leg should be straight. Make sure that your low back has a normal curve. You should be able to slip your hand in between the floor and the small of your back, with your palm touching the floor and your back touching the back of your hand. 2. Tighten the thigh muscles in your affected leg by pressing the back of your knee flat down to the floor. Hold your knee straight. 3. Keeping the thigh muscles tight and your leg straight, lift your affected leg up so that your heel is about 12 inches off the floor. 4. Hold for about 6 seconds, then lower your leg slowly. Rest for up to 10 seconds between repetitions. 5. Repeat 8 to 12 times. Straight-leg raises to the back    1. Lie on your stomach, and lift your leg straight up behind you (toward the ceiling). 2. Lift your toes about 6 inches off the floor, hold for about 6 seconds, then lower slowly. 3. Do 8 to 12 repetitions. Wall slide with ball squeeze    1. Stand with your back against a wall and with your feet about shoulder-width apart.  Your feet should be about 12 inches away from the wall. 2. Put a ball about the size of a soccer ball between your knees. Then slowly slide down the wall until your knees are bent about 20 to 30 degrees. 3. Tighten your thigh muscles by squeezing the ball between your knees. Hold that position for about 10 seconds, then stop squeezing. Rest for up to 10 seconds between repetitions. 4. Repeat 8 to 12 times. Follow-up care is a key part of your treatment and safety. Be sure to make and go to all appointments, and call your doctor if you are having problems. It's also a good idea to know your test results and keep a list of the medicines you take. Where can you learn more? Go to http://www.brown.com/  Enter A404 in the search box to learn more about \"Patellofemoral Pain Syndrome (Runner's Knee): Exercises. \"  Current as of: July 1, 2021               Content Version: 13.0  © 2006-2021 Healthwise, Incorporated. Care instructions adapted under license by [employer] (which disclaims liability or warranty for this information). If you have questions about a medical condition or this instruction, always ask your healthcare professional. [place] [address] any warranty or liability for your use of this information.

## 2022-03-18 PROBLEM — G47.33 OSA ON CPAP: Status: ACTIVE | Noted: 2020-01-01

## 2022-11-17 ENCOUNTER — OFFICE VISIT (OUTPATIENT)
Dept: FAMILY MEDICINE CLINIC | Age: 42
End: 2022-11-17
Payer: COMMERCIAL

## 2022-11-17 ENCOUNTER — HOSPITAL ENCOUNTER (OUTPATIENT)
Dept: LAB | Age: 42
Discharge: HOME | End: 2022-11-17
Payer: COMMERCIAL

## 2022-11-17 ENCOUNTER — TELEPHONE (OUTPATIENT)
Dept: PHYSICAL THERAPY | Age: 42
End: 2022-11-17

## 2022-11-17 ENCOUNTER — PATIENT MESSAGE (OUTPATIENT)
Dept: FAMILY MEDICINE CLINIC | Age: 42
End: 2022-11-17

## 2022-11-17 VITALS
RESPIRATION RATE: 14 BRPM | WEIGHT: 174 LBS | TEMPERATURE: 98.6 F | SYSTOLIC BLOOD PRESSURE: 116 MMHG | BODY MASS INDEX: 25.77 KG/M2 | OXYGEN SATURATION: 97 % | HEART RATE: 64 BPM | DIASTOLIC BLOOD PRESSURE: 78 MMHG | HEIGHT: 69 IN

## 2022-11-17 DIAGNOSIS — Z00.00 WELL ADULT EXAM: Primary | ICD-10-CM

## 2022-11-17 DIAGNOSIS — M62.830 LUMBAR PARASPINAL MUSCLE SPASM: ICD-10-CM

## 2022-11-17 DIAGNOSIS — Z13.220 SCREENING CHOLESTEROL LEVEL: ICD-10-CM

## 2022-11-17 DIAGNOSIS — Z99.89 OSA ON CPAP: ICD-10-CM

## 2022-11-17 DIAGNOSIS — R79.89 ELEVATED LFTS: Primary | ICD-10-CM

## 2022-11-17 DIAGNOSIS — G47.33 OSA ON CPAP: ICD-10-CM

## 2022-11-17 DIAGNOSIS — Z00.00 WELL ADULT EXAM: ICD-10-CM

## 2022-11-17 DIAGNOSIS — Z23 ENCOUNTER FOR IMMUNIZATION: ICD-10-CM

## 2022-11-17 LAB
ALBUMIN SERPL-MCNC: 4.1 G/DL (ref 3.4–5)
ALBUMIN/GLOB SERPL: 1.2 {RATIO} (ref 0.8–1.7)
ALP SERPL-CCNC: 64 U/L (ref 45–117)
ALT SERPL-CCNC: 50 U/L (ref 16–61)
ANION GAP SERPL CALC-SCNC: 4 MMOL/L (ref 3–18)
AST SERPL-CCNC: 100 U/L (ref 10–38)
BASOPHILS # BLD: 0 K/UL (ref 0–0.1)
BASOPHILS NFR BLD: 0 % (ref 0–2)
BILIRUB SERPL-MCNC: 0.5 MG/DL (ref 0.2–1)
BUN SERPL-MCNC: 22 MG/DL (ref 7–18)
BUN/CREAT SERPL: 22 (ref 12–20)
CALCIUM SERPL-MCNC: 9.5 MG/DL (ref 8.5–10.1)
CHLORIDE SERPL-SCNC: 107 MMOL/L (ref 100–111)
CHOLEST SERPL-MCNC: 184 MG/DL
CO2 SERPL-SCNC: 28 MMOL/L (ref 21–32)
CREAT SERPL-MCNC: 1.01 MG/DL (ref 0.6–1.3)
DIFFERENTIAL METHOD BLD: ABNORMAL
EOSINOPHIL # BLD: 0.1 K/UL (ref 0–0.4)
EOSINOPHIL NFR BLD: 2 % (ref 0–5)
ERYTHROCYTE [DISTWIDTH] IN BLOOD BY AUTOMATED COUNT: 12 % (ref 11.6–14.5)
GLOBULIN SER CALC-MCNC: 3.3 G/DL (ref 2–4)
GLUCOSE SERPL-MCNC: 99 MG/DL (ref 74–99)
HCT VFR BLD AUTO: 44.4 % (ref 36–48)
HDLC SERPL-MCNC: 65 MG/DL (ref 40–60)
HDLC SERPL: 2.8 {RATIO} (ref 0–5)
HGB BLD-MCNC: 15.2 G/DL (ref 13–16)
IMM GRANULOCYTES # BLD AUTO: 0 K/UL (ref 0–0.04)
IMM GRANULOCYTES NFR BLD AUTO: 0 % (ref 0–0.5)
LDLC SERPL CALC-MCNC: 103.2 MG/DL (ref 0–100)
LIPID PROFILE,FLP: ABNORMAL
LYMPHOCYTES # BLD: 1.5 K/UL (ref 0.9–3.6)
LYMPHOCYTES NFR BLD: 22 % (ref 21–52)
MCH RBC QN AUTO: 31.1 PG (ref 24–34)
MCHC RBC AUTO-ENTMCNC: 34.2 G/DL (ref 31–37)
MCV RBC AUTO: 90.8 FL (ref 78–100)
MONOCYTES # BLD: 0.5 K/UL (ref 0.05–1.2)
MONOCYTES NFR BLD: 8 % (ref 3–10)
NEUTS SEG # BLD: 4.6 K/UL (ref 1.8–8)
NEUTS SEG NFR BLD: 68 % (ref 40–73)
NRBC # BLD: 0 K/UL (ref 0–0.01)
NRBC BLD-RTO: 0 PER 100 WBC
PLATELET # BLD AUTO: 330 K/UL (ref 135–420)
PMV BLD AUTO: 8.8 FL (ref 9.2–11.8)
POTASSIUM SERPL-SCNC: 4.2 MMOL/L (ref 3.5–5.5)
PROT SERPL-MCNC: 7.4 G/DL (ref 6.4–8.2)
RBC # BLD AUTO: 4.89 M/UL (ref 4.35–5.65)
SODIUM SERPL-SCNC: 139 MMOL/L (ref 136–145)
TRIGL SERPL-MCNC: 79 MG/DL (ref ?–150)
VLDLC SERPL CALC-MCNC: 15.8 MG/DL
WBC # BLD AUTO: 6.8 K/UL (ref 4.6–13.2)

## 2022-11-17 PROCEDURE — 36415 COLL VENOUS BLD VENIPUNCTURE: CPT

## 2022-11-17 PROCEDURE — 99396 PREV VISIT EST AGE 40-64: CPT | Performed by: FAMILY MEDICINE

## 2022-11-17 PROCEDURE — 80053 COMPREHEN METABOLIC PANEL: CPT

## 2022-11-17 PROCEDURE — 85025 COMPLETE CBC W/AUTO DIFF WBC: CPT

## 2022-11-17 PROCEDURE — 80061 LIPID PANEL: CPT

## 2022-11-17 PROCEDURE — 90686 IIV4 VACC NO PRSV 0.5 ML IM: CPT | Performed by: FAMILY MEDICINE

## 2022-11-17 PROCEDURE — 90471 IMMUNIZATION ADMIN: CPT | Performed by: FAMILY MEDICINE

## 2022-11-17 NOTE — PROGRESS NOTES
Chief Complaint   Patient presents with    Physical    Back Pain     Lower back tightness; interested in dry needling     Subjective:   Damian Ricardo is a 36 y.o. male presenting for his annual checkup. ROS:  Complete 10 system ROS is obtained from the patient which is negative other than HPI    Specific concerns today:     He is using CPAP. He does have a ORIANA diagnosis. Back pain is intermittent. Definitely the home PT helped. Curious about dry needing. No Known Allergies  Past Medical History:   Diagnosis Date    COVID-19 03/2021    Blossom (type) I [facility]) atrioventricular block     saw cardiology, listed in A/P of their note    ORIANA on CPAP 2020     Past Surgical History:   Procedure Laterality Date    HX WISDOM TEETH EXTRACTION       Family History   Problem Relation Age of Onset    Other Mother         pacemaker    Diabetes Mother     Heart Disease Father         CABG in his 62s    Diabetes Sister      Social History     Tobacco Use    Smoking status: Never    Smokeless tobacco: Never    Tobacco comments:     Hookah axp 1 a month   Substance Use Topics    Alcohol use: Yes     Alcohol/week: 2.0 standard drinks     Types: 2 Glasses of wine per week     Comment: once or twice per week - wine      Objective:     Visit Vitals  /78 (BP 1 Location: Left upper arm, BP Patient Position: Sitting, BP Cuff Size: Adult)   Pulse 64   Temp 98.6 °F (37 °C) (Temporal)   Resp 14   Ht 5' 9\" (1.753 m)   Wt 174 lb (78.9 kg)   SpO2 97%   BMI 25.70 kg/m²     The patient appears well, alert, oriented x 3, in no distress. ENT normal.  Neck supple. No adenopathy or thyromegaly. ARTHUR. Lungs are clear, good air entry, no wheezes, rhonchi or rales. S1 and S2 normal, no murmurs, regular rate but abnormal rhythm. Abdomen is soft without tenderness, guarding, mass or organomegaly. Back:  Rigid paralumbar muscles. Full ROM. No midline tenderness. Extremities show no edema.  Neurological is normal without focal findings. Psych: normal affect. Mood good. Oriented x 3. Judgement and insight intact. Assessment/Plan:       ICD-10-CM ICD-9-CM    1. Well adult exam  Z00.00 V70.0 CBC WITH AUTOMATED DIFF      METABOLIC PANEL, COMPREHENSIVE      2. ORIANA on CPAP  G47.33 327.23     Z99.89 V46.8       3. Lumbar paraspinal muscle spasm  M62.830 724.8 REFERRAL TO PHYSICAL THERAPY      4. Encounter for immunization  Z23 V03.89 SC IMMUNIZ ADMIN,1 SINGLE/COMB VAC/TOXOID      INFLUENZA, FLUARIX, FLULAVAL, FLUZONE (AGE 6 MO+), AFLURIA(AGE 3Y+) IM, PF, 0.5 ML      5. Screening cholesterol level  Z13.220 V77.91 LIPID PANEL        Age and sex specific counseling. ORIANA - cont per pulm. Refer to PT for dry needling. Fasting labs. Flu vaccine administered. All chart history elements were reviewed by me at the time of the visit even though marked at time of note closure. Patient understands our medical plan. Patient has provided input and agrees with goals. Alternatives have been explained and offered. All questions answered. The patient is to call if condition worsens or fails to improve. Follow-up and Dispositions    Return in about 1 year (around 11/17/2023) for physical.  Fasting labs due at your earliest convenience.

## 2022-11-17 NOTE — PATIENT INSTRUCTIONS
Attempted to reach [facility] several times for records. No answer. Will fax request for records as STAT. Well Visit, Ages 25 to 72: Care Instructions  Well visits can help you stay healthy. Your doctor has checked your overall health and may have suggested ways to take good care of yourself. Your doctor also may have recommended tests. You can help prevent illness with healthy eating, good sleep, vaccinations, regular exercise, and other steps. Get the tests that you and your doctor decide on. Depending on your age and risks, examples might include screening for diabetes; hepatitis C; HIV; and cervical, breast, lung, and colon cancer. Screening helps find diseases before any symptoms appear. Eat healthy foods. Choose fruits, vegetables, whole grains, lean protein, and low-fat dairy foods. Limit saturated fat and reduce salt. Limit alcohol. Men should have no more than 2 drinks a day. Women should have no more than 1. For some people, no alcohol is the best choice. Exercise. Get at least 30 minutes of exercise on most days of the week. Walking can be a good choice. Reach and stay at your healthy weight. This will lower your risk for many health problems. Take care of your mental health. Try to stay connected with friends, family, and community, and find ways to manage stress. If you're feeling depressed or hopeless, talk to someone. A counselor can help. If you don't have a counselor, talk to your doctor. Talk to your doctor if you think you may have a problem with alcohol or drug use. This includes prescription medicines and illegal drugs. Avoid tobacco and nicotine: Don't smoke, vape, or chew. If you need help quitting, talk to your doctor. Practice safer sex. Getting tested, using condoms or dental dams, and limiting sex partners can help prevent STIs. Use birth control if it's important to you to prevent pregnancy. Talk with your doctor about your choices and what might be best for you. Prevent problems where you can.  Protect your skin from too much sun, wash your hands, brush your teeth twice a day, and wear a seat belt in the car. Where can you learn more? Go to http://www.[employer].com/  Enter P072 in the search box to learn more about \"Well Visit, Ages 25 to 72: Care Instructions. \"  Current as of: March 9, 2022               Content Version: 13.4  © 9234-5641 Healthwise, Incorporated. Care instructions adapted under license by [employer] (which disclaims liability or warranty for this information). If you have questions about a medical condition or this instruction, always ask your healthcare professional. [place] [address] any warranty or liability for your use of this information.

## 2022-11-22 NOTE — TELEPHONE ENCOUNTER
From: Damian Ricardo  To: Herson Ricci MD  Sent: 11/17/2022 7:49 PM EST  Subject: Question regarding METABOLIC PANEL, Dayanara Bennett. I noticed that my AST level is very high. Should I be concerned?

## 2022-12-05 ENCOUNTER — HOSPITAL ENCOUNTER (OUTPATIENT)
Dept: PHYSICAL THERAPY | Age: 42
Discharge: HOME | End: 2022-12-05
Attending: FAMILY MEDICINE
Payer: COMMERCIAL

## 2022-12-05 PROCEDURE — 97161 PT EVAL LOW COMPLEX 20 MIN: CPT

## 2022-12-05 PROCEDURE — 97110 THERAPEUTIC EXERCISES: CPT

## 2022-12-05 NOTE — PROGRESS NOTES
PT DAILY TREATMENT NOTE/LUMBAR EVAL     Patient Name: Ghassan Herndon  Date:2022  : 1980  [x]  Patient  Verified  Payor: Vinnyedson Yan / Plan: Sreekanth Nunez PPO / Product Type: PPO /    In time: 8:22  Out time: 9:08  Total Treatment Time (min): 46  Visit #: 1 of 8    Treatment Area: Muscle spasm of back [M62.830]  SUBJECTIVE  Pain Level (0-10 scale): 5/10 (average pain level when waking up)    []constant []intermittent []improving []worsening []no change since onset    Any medication changes, allergies to medications, adverse drug reactions, diagnosis change, or new procedure performed?: [x] No    [] Yes (see summary sheet for update)  Subjective functional status/changes:     PLOF: Patient enjoys working out and is working out 3x/week at a cross fit type of gym. Patient stated working out typically provides relief. Limitations to PLOF: Patient exhibits decreased core stability, decreased glut strength, decreased LE flexibility, decreased SLS stability, and decreased trunk AROM into all planes except flexion. Patient has increased tightness/tenderness along (B) lumbar paraspinals. Patient is very interested in looking into dry needling. Mechanism of Injury: Patient presents with chronic LBP that originally began in  when patient had Covid and was bed ridden for a week and a half. Patient stated the most recent flare up was this past 2022 when he woke up and his back felt \"frozen\". Patient denies an injury. Current symptoms/Complaints: Patient describes LBP as feeling very tight and sore and is located at bilateral lumbar region (left >right). Patient denies numbness/tingling, and no dysfunction with bowel or bladder. Patient has increased difficulty with getting up in the mornings, bending, and prolonged sitting (1 hour with pain level of 4/10),   Previous Treatment/Compliance: Patient participated in PT in [facility]  for low back with some relief. PMHx/Surgical Hx: No previous back/hip/knee/ankle/or foot surg, No pacemaker, irregular heart beat- placed on a CPAP machine, No cancer. 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 Living Situation: stairs in home. Challenging in the morning, but able to do a reciprocal gait pattern. Pt Goals: \"To relieve back pain. Bring to a tolerable level\"   Cognition: A & O x 2    Other:    OBJECTIVE/EXAMINATION    22 min [x]Eval                  []Re-Eval       24 min Therapeutic Exercise:  [x] See flow sheet : HEP creation and review Stand March-5x, HR/TR-5x, hip 3-way-5x (no UE support), mini squat-5x; supine: piriformis stretch-30sec each, bridge-5x, LTR-5x,  prone: HS curl-5x, Hip ER/IR-5x, press up on elbows-5x, Seated TA draw-5x    Rationale: increase ROM and increase strength to improve the patients ability to perform ADls safely. With   [] TE   [] TA   [] neuro   [] other: Patient Education: [x] Review HEP    [] Progressed/Changed HEP based on:   [] positioning   [] body mechanics   [] transfers   [] heat/ice application    [] other:      Other Objective/Functional Measures:     Physical Therapy Evaluation - Lumbar Spine ([facility])  OBJECTIVE  Posture:  Lateral Shift: [] R    [] L     [] +  [x] -  Kyphosis: [] Increased [] Decreased   [x]  WNL  Lordosis:  [] Increased [] Decreased   [x] WNL  Pelvic symmetry: [x] WNL    [] Other:    Gait:  [x] Normal     [] Abnormal:    Active Movements: [] N/A   [] Too acute   [] Other:  ROM % AROM % PROM Comments:pain, area   Forward flexion 40-60 WFL  No pain. Extension 20-30 50%   6/10 pain    SB right 20-30 Wfl  Patient reported tightness (B)    SB left 20-30 WFL     Rotation right 5-10      Rotation left 5-10        Repeated Movements   As patient did more standing extension pain lessened.        Dural Mobility:  SLR Sitting: [] R    [] L    [] +    [] -  @ (degrees):           Supine: [x] R    [x] L    [] +    [x] -  @ (degrees):   Slump Test: [x] R    [x] L [] +    [x] -  @ (degrees):     Palpation  [] Min  [x] Mod  [] Severe    Location: TTP/tightness at (B) lumbar paraspinals       Strength   L(0-5) R (0-5) N/T   Hip Flexion (L1,2) 4+ 4+ []   Knee Extension (L3,4) 5 5 []   Ankle Dorsiflexion (L4)   []   Great Toe Extension (L5)   []   Ankle Plantarflexion (S1)   []   Knee Flexion (S1,2) 5 5 []   Upper Abdominals   []   Lower Abdominals   []   Hip ER 4 (pain in low back) 4 (pain) []   Hip IR 4+ (no P!)  4+ (no P!) []   Gluteus Ludwin 4- (LBP) 4- (LBP []   Other: Glut med 4- (LBP)  4- (LBP) []     Special Tests    [place]:           Hip: Brittni:  [] R    [] L    [] +    [x] -  tightness present, but no LBP         Piriformis: [x] R    [x] L    [] +    [] - tightness present          Deficits:    Hamstrings 90/90: right: 50 deg left: 60deg     Gastrocsoleus (to neutral): Right: Left:       Global Muscular Weakness:  Abdominals: decreased core stability     Other tests/comments:  Supine bridge- full buttock clearance but 5-6/10 pain level reported    SLS on floor with EO: left: 24sec right: 17sec. Pelvic rocking noted with prone hip IR/ER   Low back discomfort reported with left prone HS curl. Pain Level (0-10 scale) post treatment: 4/10    ASSESSMENT/Changes in Function: See POC. Advised patient to perform HEP gently and to stop if pain increases. Patient will continue to benefit from skilled PT services to modify and progress therapeutic interventions, address functional mobility deficits, address ROM deficits, address strength deficits, analyze and address soft tissue restrictions, analyze and cue movement patterns, analyze and modify body mechanics/ergonomics, assess and modify postural abnormalities, and address imbalance/dizziness to attain remaining goals.      [x]  See Plan of Care  []  See progress note/recertification  []  See Discharge Summary         Progress towards goals / Updated goals:  See POC    PLAN  []  Upgrade activities as tolerated [x]  Continue plan of care  []  Update interventions per flow sheet       []  Discharge due to:_  []  Other:_      Roxie Huff, JAMIE 12/5/2022  8:05 AM

## 2022-12-05 NOTE — PROGRESS NOTES
[facility]  [address] [facility] [address]  Rishi gutierrez, [address]  [phone] fax    Plan of Care/ Statement of Necessity for Physical Therapy Services    Patient name: Chuck Rubin Start of Care: 2022   Referral source: Ann Mackenzie MD : 1980    Medical Diagnosis: Muscle spasm of back [M62.830]  Payor: Michelle Casety / Plan: [employer] PPO / Product Type: PPO /  Onset Date: 22    Treatment Diagnosis: LBP    Prior Hospitalization: see medical history Provider#: [id number]   Medications: Verified on Patient summary List    Comorbidities: Arthritis, back pain, uses CPAP    Prior Level of Function: Patient stated he enjoys working out and is working out 3x/week at a cross fit type of gym. Patient stated working out typically provides relief. The Plan of Care and following information is based on the information from the initial evaluation. Assessment/ key information: Patient presents with chronic LBP that originally began in  when patient had Covid and was bed ridden for a week and a half. Patient stated the most recent flare up was this past 2022 when he woke up and his back felt \"frozen\". Patient denies an injury. Patient describes LBP as feeling very tight and sore and is located at bilateral lumbar region (left >right). Patient denies numbness/tingling, and no dysfunction with bowel or bladder. Patient has increased difficulty with getting up in the mornings, bending, and prolonged sitting (1 hour with pain level of 4/10). Patient exhibits decreased core stability, decreased glut strength, decreased LE flexibility, decreased SLS stability, and decreased trunk AROM into all planes except flexion. Patient has increased tightness/tenderness along (B) lumbar paraspinals. Patient is very interested in looking into dry needling. Patient demonstrates potential to make functional gains within a reasonable time frame. Patient would benefit from skilled PT to address above deficits and assist with return to PLOF. Evaluation Complexity History LOW Complexity : Zero comorbidities / personal factors that will impact the outcome / POC; Examination LOW Complexity : 1-2 Standardized tests and measures addressing body structure, function, activity limitation and / or participation in recreation  ;Presentation LOW Complexity : Stable, uncomplicated  ;Clinical Decision Making MEDIUM Complexity : FOTO score of 26-74  Overall Complexity Rating: LOW   Problem List: pain affecting function, decrease ROM, decrease strength, impaired gait/ balance, decrease ADL/ functional abilitiies, decrease activity tolerance, decrease flexibility/ joint mobility, and decrease transfer abilities   Treatment Plan may include any combination of the following: Therapeutic exercise, Neuromuscular reeducation, Manual therapy, Therapeutic activity, Self care/home management, Gait training, Needle insertion w/o injection (1 or 2 muscles), and Needle insertion w/o injection (3+ muscles)  Patient / Family readiness to learn indicated by: asking questions, trying to perform skills, and interest  Persons(s) to be included in education: patient (P)  Barriers to Learning/Limitations: None  Patient Goal (s): To relieve back pain. Bring to a tolerable level\"   Patient Self Reported Health Status: good  Rehabilitation Potential: good    Short Term Goals: To be accomplished in 2 weeks:   1. Patient will be independent and compliant with HEP 1-2x/day to increase ease with ADLs safely. Eval: HEP established   2. Patient will be able to perform 10 supine bridges with pain no more then 2/10 to increase ease with bed mobility. Eval: Supine bridge- full buttock clearance but 5-6/10 pain level reported      Long Term Goals: To be accomplished in 4 weeks:   1. Patient will improve FOTO to at least 77 to demonstrate improved function. Eval; FOTO: 72   2.  Patient will report average pain level in the mornings no more then 3/10 to assist with return to PLOF. Eval: 5/10 pain level reported. 3. Patient will report being able to sit for 1 hour with pain no more then 1/10 to increase ease with work duties. Eval: prolonged sitting (1 hour with pain level of 4/10)  Frequency / Duration: Patient to be seen 2 times per week for 4 weeks. Patient/ Caregiver education and instruction: Diagnosis, prognosis, exercises   [x]  Plan of care has been reviewed with CASH Rodríguez, PT 12/5/2022 8:02 AM    ________________________________________________________________________    I certify that the above Therapy Services are being furnished while the patient is under my care. I agree with the treatment plan and certify that this therapy is necessary.     [de-identified] Signature:____________Date:_________TIME:________     Cam Ellison MD  ** Signature, Date and Time must be completed for valid certification **    Please sign and return to [facility] [address]  [phone] fax

## 2022-12-05 NOTE — PROGRESS NOTES
Request for use of Dry Needling/Intramuscular Manual Therapy  Patient: Michela Almaraz     Referral Source: Elroy Feliz MD  Diagnosis: Muscle spasm of back [M62.830]      : 1980  Date of initial visit: 22   Attended visits: 1  Missed Visits: 0    Based on findings from the physical therapy examination and evaluation, the evaluating therapist believes the patient, Michela Almaraz  would benefit from including Dry Needling as part of the plan of care. Dry needling is a treatment technique utilized in conjunction with other PT interventions to inactivate myofascial trigger points and the pain and dysfunction they cause. Dry Needling is an advanced procedure that requires additional training including greater than 54 hours of intensive course work. Physical Therapists at [address] [place] [address] are trained and/or certified through [employer] for their education. PROCEDURE:  Solid filament sterile needle (typically 0.3mm/30 gauge) inserted into a trigger point  Repeated movements inactivate the trigger points, taking 30-60 seconds per site  Typically consists of 1 dry needling session per week and a possible second treatment including muscle re-education, flexibility, strengthening and other manual techniques to facilitate the benefits of dry needling     BENEFITS:  Inactivation of trigger points  Decreased pain  Increased muscle length  Improved movement patterns  Restoration of function POTENTIAL RISKS:  Post-needling soreness  Infection  Bruising/bleeding  Penetration of a nerve  Pneumothorax   All treating PTs have been thoroughly educated in avoiding adverse reactions    If you agree with this recommendation, please sign this form and fax it to us at [phone]. If you have questions or concerns regarding dry needling or any other treatment we may be providing, please contact us at [phone].     Thank you for allowing us to assist in the care of your patient. Saskia Chaidez, PT    12/5/2022 2:09 PM     NOTE TO PHYSICIAN:  PLEASE COMPLETE THE ORDERS BELOW AND   FAX TO [facility]: [phone]  If you are unable to process this request in 24 hours please contact our office:   [phone]    I have read the above request and AGREE to the recommendation of including dry needling as part of the plan of care.       Physicians signature: _________________________Date: _________Time:________

## 2022-12-13 ENCOUNTER — HOSPITAL ENCOUNTER (OUTPATIENT)
Dept: LAB | Age: 42
Discharge: HOME | End: 2022-12-13
Payer: COMMERCIAL

## 2022-12-13 ENCOUNTER — HOSPITAL ENCOUNTER (OUTPATIENT)
Dept: PHYSICAL THERAPY | Age: 42
Discharge: HOME | End: 2022-12-13
Attending: FAMILY MEDICINE
Payer: COMMERCIAL

## 2022-12-13 DIAGNOSIS — R79.89 ELEVATED LFTS: ICD-10-CM

## 2022-12-13 LAB
ALBUMIN SERPL-MCNC: 4.1 G/DL (ref 3.4–5)
ALBUMIN/GLOB SERPL: 1.2 {RATIO} (ref 0.8–1.7)
ALP SERPL-CCNC: 69 U/L (ref 45–117)
ALT SERPL-CCNC: 31 U/L (ref 16–61)
AST SERPL-CCNC: 35 U/L (ref 10–38)
BILIRUB DIRECT SERPL-MCNC: 0.1 MG/DL (ref 0–0.2)
BILIRUB SERPL-MCNC: 0.4 MG/DL (ref 0.2–1)
GLOBULIN SER CALC-MCNC: 3.3 G/DL (ref 2–4)
PROT SERPL-MCNC: 7.4 G/DL (ref 6.4–8.2)

## 2022-12-13 PROCEDURE — 80076 HEPATIC FUNCTION PANEL: CPT

## 2022-12-13 PROCEDURE — 97112 NEUROMUSCULAR REEDUCATION: CPT

## 2022-12-13 PROCEDURE — 97140 MANUAL THERAPY 1/> REGIONS: CPT

## 2022-12-13 PROCEDURE — 36415 COLL VENOUS BLD VENIPUNCTURE: CPT

## 2022-12-13 PROCEDURE — 97110 THERAPEUTIC EXERCISES: CPT

## 2022-12-13 NOTE — PROGRESS NOTES
PT DAILY TREATMENT NOTE     Patient Name: Socorro Segal  Date:  : 1980  [x]  Patient  Verified  Payor: Song Roa / Plan: Shilpa Mirza PPO / Product Type: PPO /    In time:0700  Out time:07  Total Treatment Time (min): 36  Visit #: 2 of 8    Treatment Area: Muscle spasm of back [M62.830]    SUBJECTIVE  Pain Level (0-10 scale): 4  Any medication changes, allergies to medications, adverse drug reactions, diagnosis change, or new procedure performed?: [x] No    [] Yes (see summary sheet for update)  Subjective functional status/changes:   [] No changes reported  The pt reports he has been trying his HEP.      OBJECTIVE  Min Type Additional Details    [] Estim:  []Unatt       []IFC  []Premod                        []Other:  []w/ice   []w/heat  Position:  Location:    [] Estim: []Att    []TENS instruct  []NMES                    []Other:  []w/US   []w/ice   []w/heat  Position:  Location:    []  Traction: [] Cervical       []Lumbar                       [] Prone          []Supine                       []Intermittent   []Continuous Lbs:  [] before manual  [] after manual    []  Ultrasound: []Continuous   [] Pulsed                           []1MHz   []3MHz W/cm2:  Location:    []  Iontophoresis with dexamethasone         Location: [] Take home patch   [] In clinic    []  Ice     []  heat  []  Ice massage  []  Laser   []  Anodyne Position:  Location:    []  Laser with stim  []  Other:  Position:  Location:    []  Vasopneumatic Device    []  Right     []  Left  Pre-treatment girth:  Post-treatment girth:  Measured at (location):  Pressure:       [] lo [] med [] hi   Temperature: [] lo [] med [] hi   [] Skin assessment post-treatment:  []intact []redness- no adverse reaction    []redness - adverse reaction:       12 min Therapeutic Exercise:  [x] See flow sheet :   Rationale: increase ROM and increase strength to improve the patients ability to perform ADL    16 min Neuromuscular Re-education:  [x]  See flow sheet :   Rationale: increase strength, improve coordination, and increase proprioception  to improve the patients ability to perform functional tasks. 8 min Manual Therapy:  pt prone performed Graston to B lumbar paraspinals with GT4   The manual therapy interventions were performed at a separate and distinct time from the therapeutic activities interventions. Rationale: decrease pain, increase ROM, and increase tissue extensibility to perform functional tasks. With   [] TE   [] TA   [] neuro   [] other: Patient Education: [x] Review HEP    [] Progressed/Changed HEP based on:   [] positioning   [] body mechanics   [] transfers   [] heat/ice application    [] other:      Other Objective/Functional Measures: initiated first follow up session     Pain Level (0-10 scale) post treatment: 2    ASSESSMENT/Changes in Function:  The pt complete therex today without complaint. + cues with Trap bar initially for appropriate form. + response to Graston with diminished pain post treatment. The pt will likely benefit from DN as well. Patient will continue to benefit from skilled PT services to modify and progress therapeutic interventions, address functional mobility deficits, address ROM deficits, address strength deficits, analyze and address soft tissue restrictions, analyze and cue movement patterns, analyze and modify body mechanics/ergonomics, and assess and modify postural abnormalities to attain remaining goals. []  See Plan of Care  []  See progress note/recertification  []  See Discharge Summary         Progress towards goals / Updated goals:  Short Term Goals: To be accomplished in 2 weeks:               1. Patient will be independent and compliant with HEP 1-2x/day to increase ease with ADLs safely.                Eval: HEP established   Current: reports HEP compliance on 12-13-22               2. Patient will be able to perform 10 supine bridges with pain no more then 2/10 to increase ease with bed mobility. Eval: Supine bridge- full buttock clearance but 5-6/10 pain level reported      Long Term Goals: To be accomplished in 4 weeks:               1. Patient will improve FOTO to at least 77 to demonstrate improved function. Eval; FOTO: 72               2. Patient will report average pain level in the mornings no more then 3/10 to assist with return to PLOF. Eval: 5/10 pain level reported. 3. Patient will report being able to sit for 1 hour with pain no more then 1/10 to increase ease with work duties.                Eval: prolonged sitting (1 hour with pain level of 4/10)    PLAN  []  Upgrade activities as tolerated     [x]  Continue plan of care  []  Update interventions per flow sheet       []  Discharge due to:_  []  Other:_      Johnathan Viramontes, PT 12/13/2022  7:06 AM    Future Appointments   Date Time Provider Mike Suárez   12/15/2022 11:30 AM Bhavna Scanlon, PT [facility]   12/20/2022  7:30 AM Helen Dilling, PT [facility]   12/23/2022  7:00 AM Helen Dilling, PT [facility]   12/28/2022 10:30 AM Bhavna Scanlon, PT [facility]   12/30/2022  9:30 AM Bhavna Scanlon, PT [facility]   1/3/2023  7:30 AM Helen Dilling, PT [facility]

## 2022-12-15 ENCOUNTER — HOSPITAL ENCOUNTER (OUTPATIENT)
Dept: PHYSICAL THERAPY | Age: 42
Discharge: HOME | End: 2022-12-15
Attending: FAMILY MEDICINE
Payer: COMMERCIAL

## 2022-12-15 PROCEDURE — 97110 THERAPEUTIC EXERCISES: CPT

## 2022-12-15 PROCEDURE — 20561 NDL INSJ W/O NJX 3+ MUSC: CPT

## 2022-12-15 PROCEDURE — 97140 MANUAL THERAPY 1/> REGIONS: CPT

## 2022-12-15 NOTE — PROGRESS NOTES
PT DAILY TREATMENT NOTE 10-18    Patient Name: Ansel Boeck  Date:12/15/2022  : 1980  [x]  Patient  Verified  Payor: Ban Langley / Plan: Lowell Jarrett PPO / Product Type: PPO /    In time:1130  Out time:1220  Total Treatment Time (min): 50  Visit #: 3 of 8  Treatment Area: Muscle spasm of back [M62.830]    SUBJECTIVE  Pain Level (0-10 scale): 4  Any medication changes, allergies to medications, adverse drug reactions, diagnosis change, or new procedure performed?: [x] No    [] Yes (see summary sheet for update)  Subjective functional status/changes:   [] No changes reported  I feel like the Graston helped; I'm ready to try the needling. OBJECTIVE  Modality rationale: decrease pain to improve the patients ability to tolerate post needle soreness   Min Type Additional Details   10 [x]  Ice     []  heat  []  Ice massage  []  Laser   []  Anodyne Position:prone  Location:L/S   [] Skin assessment post-treatment:  []intact []redness- no adverse reaction    []redness - adverse reaction:         15 min Therapeutic Exercise:  [x] See flow sheet :   Rationale: increase ROM to improve the patients ability to perform daily activities      23 min Manual Therapy:  IMDN to include education, assessment, set-up, palpation, hemostasis, STM/stretch to treated areas and reassessment only   The manual therapy interventions were performed at a separate and distinct time from the therapeutic activities interventions. Rationale: decrease pain, increase ROM, and increase tissue extensibility to relax mm for daily activities   2 min Dry Needling:   []  CPT 06545:  needle insertion(s) without injection(s); 1 or 2 muscle(s)  [x]  CPT 05686:  needle insertion(s) without injection(s); 3 or more muscles. Rationale: decrease pain, increase ROM, increase tissue extensibility, and decrease trigger points to relax mm for daily activities     Dry Needling Procedure Note    Procedure:  An intramuscular manual therapy (dry needling) and a neuro-muscular re-education treatment was done to deactivate myofascial trigger points with a 30 gauge filament needle under aseptic technique.     Indications:  [x] Myofascial pain and dysfunction [] Muscled spasms  [x] Myalgia/myositis   [] Muscle cramps  [x] Muscle imbalances  [] Other:    Chart reviewed for the following:  IRonnie, PT, have reviewed the medical history, summary list and precautions/contraindications for [address] performed immediately prior to start of procedure:  Ronnie BRICENO, PT, have performed the following reviews on Shannan Burgess prior to the start of the session:      [x] Verified patient identification by name and date of birth    [x] Agreement on all muscles being treated was verified   [x] Purpose of dry needling, side effects, possible complications, risks and benefits were explained to the patient   [x] Procedure site(s) verified  [x] Patient was positioned for comfort and draped for privacy  [x] Informed Consent was signed (initial visit) and verified verbally (subsequent visits)  [x] Patient was instructed on the need to report the use of blood thinners and/or immunosuppressant medications  [x] How to respond to possible adverse effects of treatment  [x] Self treatment of post needling soreness: ice, heat (moist heat, heat wraps) and stretching  [x] Opportunity was given to ask any questions, all questions were answered            Time: 1135  Date of procedure: 12/15/2022    Treatment: The following muscles were treated today with intramuscular dry needling  [] Left [] Right Abdominals: Ant Rectus Abdominis  [] Left [] Right External Oblique / Internal Oblique / Transverse Abdominis  [] Left [] Right Thoracic Multifidi / Rotatores  [x] Left [x] Right Iliocostalis Thoracis / Clarene Hertz  [x] Left [x] Right Longissimus Thoracis / Lumborum  [x] Left [x] Right Lumbar Multifidi  [] Left [] Right Serratus Posterior Inferior  [] Left [] Right Quadratus Lumborum  [] Left [] Right Psoas  [] Left [] Right Iliacus  [] Left [] Right Iliopsoas (inguinal)  [] Left [] Right Piriformis  [] Left [] Right Quadratus Femoris  [] Left [] Right Nya You / Viki Akiko / Terence Bruno  [] Left [] Right Obturator Internus  [] Left [] Right Obturator Externus / Hinton Saris / Inferior Gemellus    [] Left [] Right Tensor Fasciae Annie  [] Left [] Right Iliotibial Band  [] Left [] Right Pectineus  [] Left [] Right Sartorius  [] Left [] Right Gracilis  [] Left [] Right Adductor Brevis / Adductor Longus / Adductor Jj  [] Left [] Right Rectus Femoris / Vastus Lateralis / Vastus Intermedius / Vastus Medialis Obliquus  [] Left [] Right Tibialis Anterior / Posterior  [] Left [] Right Extensor Digitorum Longus / Brevis  [] Left [] Right Extensor Hallucis Longus / Brevis  [] Left [] Right Hamstrings: Biceps Femoris / Lizbet Bran / Semimembranosis  [] Left [] Right Popliteus / Planteris  [] Left [] Right Gastrocnemius Medial / Lateral  [] Left [] Right Soleus  [] Left [] Right Peronei: Longus / Dorean Slight / Tertius  [] Left [] Right Flexor Digitorum Longus / Brevis  [] Left [] Right Flexor Hallucis Longus / Brevis  [] Left [] Right Quadratus Plantae  [] Left [] Right Abductor Digiti Minimi  [] Left [] Right Foot Interossei  [] Left [] Right Other:    Patient's response to today's treatment:  [x] Latent Twitch Response  [x] Muscle relaxation [x] Pain Relief  [x] Post needling soreness [x] without complications  [] Increased Range of Motion  [] Other:     Performed by: Gilbert Galarza PT    With   [] TE   [] TA   [] neuro   [] other: Patient Education: [x] Review HEP    [] Progressed/Changed HEP based on:   [] positioning   [] body mechanics   [] transfers   [] heat/ice application    [] other:      Other Objective/Functional Measures:      Pain Level (0-10 scale) post treatment: 6-7    ASSESSMENT/Changes in Function: Initiated DN today; able to elicit twitches in all mm needled. Significant hamstring tightness noted B , left > right. Patient will continue to benefit from skilled PT services to modify and progress therapeutic interventions, address functional mobility deficits, address ROM deficits, address strength deficits, analyze and address soft tissue restrictions, and analyze and cue movement patterns to attain remaining goals. []  See Plan of Care  []  See progress note/recertification  []  See Discharge Summary         Progress towards goals / Updated goals:  Short Term Goals: To be accomplished in 2 weeks:               1. Patient will be independent and compliant with HEP 1-2x/day to increase ease with ADLs safely. Eval: HEP established              Current: reports HEP compliance on 12-13-22               2. Patient will be able to perform 10 supine bridges with pain no more then 2/10 to increase ease with bed mobility. Eval: Supine bridge- full buttock clearance but 5-6/10 pain level reported      Long Term Goals: To be accomplished in 4 weeks:               1. Patient will improve FOTO to at least 77 to demonstrate improved function. Eval; FOTO: 72               2. Patient will report average pain level in the mornings no more then 3/10 to assist with return to PLOF. Eval: 5/10 pain level reported. 3. Patient will report being able to sit for 1 hour with pain no more then 1/10 to increase ease with work duties.                Eval: prolonged sitting (1 hour with pain level of 4/10)    PLAN  [x]  Upgrade activities as tolerated     []  Continue plan of care  []  Update interventions per flow sheet       []  Discharge due to:_  []  Other:_      ROXANA Bowen, [facility] 12/15/2022  8:46 AM    Future Appointments   Date Time Provider Mike uSárez   12/15/2022 11:30 AM [facility]   12/20/2022  7:30 AM Mariaa Fraga, PT [facility]   12/28/2022 10:30 AM Wander Craven, PT [facility]   12/30/2022  9:30 AM Wander Craven, PT [facility]   1/3/2023  7:30 AM Dinh Adams, PT [facility]   1/6/2023  7:00 AM Misti Perera, PT [facility]

## 2022-12-20 ENCOUNTER — HOSPITAL ENCOUNTER (OUTPATIENT)
Dept: PHYSICAL THERAPY | Age: 42
Discharge: HOME | End: 2022-12-20
Attending: FAMILY MEDICINE
Payer: COMMERCIAL

## 2022-12-20 PROCEDURE — 97110 THERAPEUTIC EXERCISES: CPT

## 2022-12-20 PROCEDURE — 20560 NDL INSJ W/O NJX 1 OR 2 MUSC: CPT

## 2022-12-20 PROCEDURE — 97140 MANUAL THERAPY 1/> REGIONS: CPT

## 2022-12-20 NOTE — PROGRESS NOTES
PT DAILY TREATMENT NOTE     Patient Name: Mee Simmons  Date:2022  : 1980  [x]  Patient  Verified  Payor: Manual Rout / Plan: Shilo Clark PPO / Product Type: PPO /    In time:730  Out time:815  Total Treatment Time (min): 45  Visit #: 4 of 8    Treatment Area: Muscle spasm of back [M62.830]    SUBJECTIVE  Pain Level (0-10 scale): 5  Any medication changes, allergies to medications, adverse drug reactions, diagnosis change, or new procedure performed?: [x] No    [] Yes (see summary sheet for update)  Subjective functional status/changes:   [] No changes reported  The pt reports he did well with DN session.      OBJECTIVE    Modality rationale: decrease inflammation and decrease pain to improve the patients ability to perform ADL   Min Type Additional Details    [] Estim:  []Unatt       []IFC  []Premod                        []Other:  []w/ice   []w/heat  Position:  Location:    [] Estim: []Att    []TENS instruct  []NMES                    []Other:  []w/US   []w/ice   []w/heat  Position:  Location:    []  Traction: [] Cervical       []Lumbar                       [] Prone          []Supine                       []Intermittent   []Continuous Lbs:  [] before manual  [] after manual    []  Ultrasound: []Continuous   [] Pulsed                           []1MHz   []3MHz W/cm2:  Location:    []  Iontophoresis with dexamethasone         Location: [] Take home patch   [] In clinic   10 [x]  Ice     []  heat  []  Ice massage  []  Laser   []  Anodyne Position:seated  Location:lumbar    []  Laser with stim  []  Other:  Position:  Location:    []  Vasopneumatic Device    []  Right     []  Left  Pre-treatment girth:  Post-treatment girth:  Measured at (location):  Pressure:       [] lo [] med [] hi   Temperature: [] lo [] med [] hi   [] Skin assessment post-treatment:  []intact []redness- no adverse reaction    []redness - adverse reaction:     8 min Therapeutic Exercise:  [x] See flow sheet : Rationale: increase ROM and increase strength to improve the patients ability to perform ADL         24 min Manual Therapy:  IMDN to include education, assessment, set-up, palpation, hemostasis, STM/stretch to treated areas and reassessment only. Followed by Terence Tanner with GT 5 to B lumbar paraspinals with pt in Child's pose position. The manual therapy interventions were performed at a separate and distinct time from the therapeutic activities interventions. Rationale: decrease pain, increase ROM, and increase tissue extensibility to perform ADL  3 min Dry Needling:   [x]  CPT 26414:  needle insertion(s) without injection(s); 1 or 2 muscle(s)  []  CPT 74991:  needle insertion(s) without injection(s); 3 or more muscles. Rationale: decrease pain, increase ROM, increase tissue extensibility, and decrease trigger points to perform ADL    Dry Needling Procedure Note    Procedure: An intramuscular manual therapy (dry needling) and a neuro-muscular re-education treatment was done to deactivate myofascial trigger points with a 30 gauge filament needle under aseptic technique.     Indications:  [x] Myofascial pain and dysfunction [] Muscled spasms  [x] Myalgia/myositis   [] Muscle cramps  [x] Muscle imbalances  [] Other:    Chart reviewed for the following:  Godwin BRICENO PT, have reviewed the medical history, summary list and precautions/contraindications for [address] performed immediately prior to start of procedure:  Godwin BRICENO PT, have performed the following reviews on Shannan Burgess prior to the start of the session:      [x] Verified patient identification by name and date of birth    [x] Agreement on all muscles being treated was verified   [x] Purpose of dry needling, side effects, possible complications, risks and benefits were explained to the patient   [x] Procedure site(s) verified  [x] Patient was positioned for comfort and draped for privacy  [x] Informed Consent was signed (initial visit) and verified verbally (subsequent visits)  [x] Patient was instructed on the need to report the use of blood thinners and/or immunosuppressant medications  [x] How to respond to possible adverse effects of treatment  [x] Self treatment of post needling soreness: ice, heat (moist heat, heat wraps) and stretching  [x] Opportunity was given to ask any questions, all questions were answered            Time: 0739  Date of procedure: 12/20/2022    Treatment: The following muscles were treated today with intramuscular dry needling  [] Left [] Right Abdominals: Ant Rectus Abdominis  [] Left [] Right External Oblique / Internal Oblique / Transverse Abdominis  [] Left [] Right Thoracic Multifidi / Rotatores  [x] Left [x] Right Iliocostalis Thoracis / Lumborum  [x] Left [x] Right Longissimus Thoracis / Lumborum  [x] Left [x] Right Lumbar Multifidi  [] Left [] Right Serratus Posterior Inferior  [] Left [] Right Quadratus Lumborum  [] Left [] Right Psoas  [] Left [] Right Iliacus  [] Left [] Right Iliopsoas (inguinal)  [] Left [] Right Piriformis  [] Left [] Right Quadratus Femoris  [] Left [] Right Seble Fsicher / Patrick Senior / Deleta Gutting  [] Left [] Right Obturator Internus  [] Left [] Right Obturator Externus / Majo Butt / Inferior Gemellus    [] Left [] Right Tensor Fasciae Annie  [] Left [] Right Iliotibial Band  [] Left [] Right Pectineus  [] Left [] Right Sartorius  [] Left [] Right Gracilis  [] Left [] Right Adductor Brevis / Adductor Longus / Zahra Must  [] Left [] Right Rectus Femoris / Vastus Lateralis / Vastus Intermedius / Vastus Medialis Obliquus  [] Left [] Right Tibialis Anterior / Posterior  [] Left [] Right Extensor Digitorum Longus / Brevis  [] Left [] Right Extensor Hallucis Longus / Brevis  [] Left [] Right Hamstrings: Biceps Femoris / Semitendinosis / Semimembranosis  [] Left [] Right Popliteus / Planteris  [] Left [] Right Gastrocnemius Medial / Lateral  [] Left [] Right Soleus  [] Left [] Right Peronei: Longus / Mercie Lemming / Tertius  [] Left [] Right Flexor Digitorum Longus / Brevis  [] Left [] Right Flexor Hallucis Longus / Brevis  [] Left [] Right Quadratus Plantae  [] Left [] Right Abductor Digiti Minimi  [] Left [] Right Foot Interossei  [] Left [] Right Other:    Patient's response to today's treatment:  [x] Latent Twitch Response  [x] Muscle relaxation [] Pain Relief  [x] Post needling soreness [x] without complications  [] Increased Range of Motion  [] Other:     Performed by: Darlin Opitz, PT                With   [] TE   [] TA   [] neuro   [] other: Patient Education: [x] Review HEP    [] Progressed/Changed HEP based on:   [] positioning   [] body mechanics   [] transfers   [] heat/ice application    [] other:      Other Objective/Functional Measures: no changes     Pain Level (0-10 scale) post treatment: 3    ASSESSMENT/Changes in Function: the pt demonstrated diminished myofascial tightness following DN and Graston treatment. Some post needle soreness noted but overall improvement is noted. Decreased pain level noted post treatment. Patient will continue to benefit from skilled PT services to modify and progress therapeutic interventions, address functional mobility deficits, address ROM deficits, address strength deficits, analyze and address soft tissue restrictions, and analyze and cue movement patterns to attain remaining goals. []  See Plan of Care  []  See progress note/recertification  []  See Discharge Summary         Progress towards goals / Updated goals:  Short Term Goals: To be accomplished in 2 weeks:               1. Patient will be independent and compliant with HEP 1-2x/day to increase ease with ADLs safely.                Eval: HEP established              Current: reports HEP compliance on 12-13-22               2. Patient will be able to perform 10 supine bridges with pain no more then 2/10 to increase ease with bed mobility. Eval: Supine bridge- full buttock clearance but 5-6/10 pain level reported      Long Term Goals: To be accomplished in 4 weeks:               1. Patient will improve FOTO to at least 77 to demonstrate improved function. Eval; FOTO: 72               2. Patient will report average pain level in the mornings no more then 3/10 to assist with return to PLOF. Eval: 5/10 pain level reported. Current: pain 5/10 upon arrival 12-20-22               3. Patient will report being able to sit for 1 hour with pain no more then 1/10 to increase ease with work duties.                Eval: prolonged sitting (1 hour with pain level of 4/10)    PLAN  []  Upgrade activities as tolerated     [x]  Continue plan of care  []  Update interventions per flow sheet       []  Discharge due to:_  []  Other:_      Stephy Donovan, PT 12/20/2022  7:35 AM    Future Appointments   Date Time Provider Mike Suárez   12/28/2022 10:30 AM Queenie Mata PT [facility]   12/30/2022  9:30 AM Queenie Mata PT [facility]   1/3/2023  7:30 AM Bijal Ellis PT [facility]   1/6/2023  7:00 AM Bijal Ellis, PT [facility]

## 2022-12-23 ENCOUNTER — APPOINTMENT (OUTPATIENT)
Dept: PHYSICAL THERAPY | Age: 42
End: 2022-12-23
Attending: FAMILY MEDICINE
Payer: COMMERCIAL

## 2022-12-28 ENCOUNTER — HOSPITAL ENCOUNTER (OUTPATIENT)
Dept: PHYSICAL THERAPY | Age: 42
Discharge: HOME | End: 2022-12-28
Attending: FAMILY MEDICINE
Payer: COMMERCIAL

## 2022-12-28 PROCEDURE — 20561 NDL INSJ W/O NJX 3+ MUSC: CPT

## 2022-12-28 PROCEDURE — 97112 NEUROMUSCULAR REEDUCATION: CPT

## 2022-12-28 PROCEDURE — 97140 MANUAL THERAPY 1/> REGIONS: CPT

## 2022-12-28 PROCEDURE — 97110 THERAPEUTIC EXERCISES: CPT

## 2022-12-28 NOTE — PROGRESS NOTES
PT DAILY TREATMENT NOTE 10    Patient Name: Michelle Jay  Date:2022  : 1980  [x]  Patient  Verified  Payor: Dina eJnkins / Plan: Lenin Ovalle PPO / Product Type: PPO /    In time:1030  Out time:1125  Total Treatment Time (min): 55  Visit #: 5 of 8        Treatment Area: Muscle spasm of back [M62.830]    SUBJECTIVE  Pain Level (0-10 scale): 4  Any medication changes, allergies to medications, adverse drug reactions, diagnosis change, or new procedure performed?: [x] No    [] Yes (see summary sheet for update)  Subjective functional status/changes:   [] No changes reported  I feel like my left side is a lot looser. OBJECTIVE  Modality rationale: decrease pain to improve the patients ability to tolerate post needle soreness   Min Type Additional Details   10 [x]  Ice     []  heat  []  Ice massage  []  Laser   []  Anodyne Position:prone  Location:L/S   [] Skin assessment post-treatment:  []intact []redness- no adverse reaction    []redness - adverse reaction:         27 min Therapeutic Exercise:  [x] See flow sheet :   Rationale: increase ROM and increase strength to improve the patients ability to perform daily activities      8 min Neuromuscular Re-education:  []  See flow sheet :   Rationale: increase strength  to improve the patients ability to recruit TA for stability for daily activities     8 min Manual Therapy:  IMDN to include education, assessment, set-up, palpation, hemostasis, STM/stretch to treated areas and reassessment only   The manual therapy interventions were performed at a separate and distinct time from the therapeutic activities interventions. Rationale: decrease pain, increase ROM, increase tissue extensibility, and decrease trigger points to relax mm for daily activities   2 min Dry Needling:   []  CPT 45462:  needle insertion(s) without injection(s); 1 or 2 muscle(s)  [x]  CPT 54628:  needle insertion(s) without injection(s); 3 or more muscles.      Rationale: decrease pain, increase tissue extensibility, and decrease trigger points to relax mm for daily activities     Dry Needling Procedure Note    Procedure: An intramuscular manual therapy (dry needling) and a neuro-muscular re-education treatment was done to deactivate myofascial trigger points with a 30 gauge filament needle under aseptic technique.     Indications:  [] Myofascial pain and dysfunction [] Muscled spasms  [] Myalgia/myositis   [] Muscle cramps  [] Muscle imbalances  [] Other:    Chart reviewed for the following:  [place]fort, PT, have reviewed the medical history, summary list and precautions/contraindications for [address] performed immediately prior to start of procedure:  Comfort BRICENO, PT, have performed the following reviews on Shannan Burgess prior to the start of the session:      [x] Verified patient identification by name and date of birth    [x] Agreement on all muscles being treated was verified   [x] Purpose of dry needling, side effects, possible complications, risks and benefits were explained to the patient   [x] Procedure site(s) verified  [x] Patient was positioned for comfort and draped for privacy  [x] Informed Consent was signed (initial visit) and verified verbally (subsequent visits)  [x] Patient was instructed on the need to report the use of blood thinners and/or immunosuppressant medications  [x] How to respond to possible adverse effects of treatment  [x] Self treatment of post needling soreness: ice, heat (moist heat, heat wraps) and stretching  [x] Opportunity was given to ask any questions, all questions were answered            Time: 1403  Date of procedure: 12/28/2022    Treatment: The following muscles were treated today with intramuscular dry needling  [] Left [] Right Abdominals: Ant Rectus Abdominis  [] Left [] Right External Oblique / Internal Oblique / Transverse Abdominis  [] Left [] Right Thoracic Multifidi / Rotatores  [x] Left [x] Right Iliocostalis Thoracis / Lumborum  [x] Left [x] Right Longissimus Thoracis / Lumborum  [x] Left [x] Right Lumbar Multifidi  [] Left [] Right Serratus Posterior Inferior  [] Left [] Right Quadratus Lumborum  [] Left [] Right Psoas  [] Left [] Right Iliacus  [] Left [] Right Iliopsoas (inguinal)  [] Left [] Right Piriformis  [] Left [] Right Quadratus Femoris  [] Left [] Right Otila Easter / Catheline Martinis / Tommie Yonyler  [] Left [] Right Obturator Internus  [] Left [] Right Obturator Externus / Mal Beba / Inferior Gemellus    [] Left [] Right Tensor Fasciae Annie  [] Left [] Right Iliotibial Band  [] Left [] Right Pectineus  [] Left [] Right Sartorius  [] Left [] Right Gracilis  [] Left [] Right Adductor Brevis / Adductor Longus / Adductor Jj  [] Left [] Right Rectus Femoris / Vastus Lateralis / Vastus Intermedius / Vastus Medialis Obliquus  [] Left [] Right Tibialis Anterior / Posterior  [] Left [] Right Extensor Digitorum Longus / Brevis  [] Left [] Right Extensor Hallucis Longus / Brevis  [] Left [] Right Hamstrings: Biceps Femoris / Ervin Kruger / Semimembranosis  [] Left [] Right Popliteus / Planteris  [] Left [] Right Gastrocnemius Medial / Lateral  [] Left [] Right Soleus  [] Left [] Right Peronei: Longus / Darwyn Ebervale / Tertius  [] Left [] Right Flexor Digitorum Longus / Brevis  [] Left [] Right Flexor Hallucis Longus / Brevis  [] Left [] Right Quadratus Plantae  [] Left [] Right Abductor Digiti Minimi  [] Left [] Right Foot Interossei  [] Left [] Right Other:    Patient's response to today's treatment:  [x] Latent Twitch Response  [x] Muscle relaxation [x] Pain Relief  [x] Post needling soreness [x] without complications  [] Increased Range of Motion  [] Other:     Performed by: Lamount [place], PT    With   [] TE   [] TA   [] neuro   [] other: Patient Education: [x] Review HEP    [] Progressed/Changed HEP based on:   [] positioning   [] body mechanics   [] transfers   [] heat/ice application    [] other:      Other Objective/Functional Measures:      Pain Level (0-10 scale) post treatment: 2    ASSESSMENT/Changes in Function: Good tolerance to DN; able to elicit multiple twitches in all mm needled. Fatigues with sidelying hip exercises. Changed figure four piriformis stretch to hip adductor (knee to opposite shoulder) stretch as patient reported he felt a better stretch in his piriformis. Patient will continue to benefit from skilled PT services to modify and progress therapeutic interventions, address functional mobility deficits, address ROM deficits, address strength deficits, analyze and address soft tissue restrictions, and analyze and cue movement patterns to attain remaining goals. []  See Plan of Care  []  See progress note/recertification  []  See Discharge Summary         Progress towards goals / Updated goals:  Short Term Goals: To be accomplished in 2 weeks:               1. Patient will be independent and compliant with HEP 1-2x/day to increase ease with ADLs safely. Eval: HEP established              Current: reports HEP compliance on 12-13-22               2. Patient will be able to perform 10 supine bridges with pain no more then 2/10 to increase ease with bed mobility. Eval: Supine bridge- full buttock clearance but 5-6/10 pain level reported   Current: able to perform 12 reps with minimal pain- goal met 12/28/22     Long Term Goals: To be accomplished in 4 weeks:               1. Patient will improve FOTO to at least 77 to demonstrate improved function. Eval; FOTO: 72               2. Patient will report average pain level in the mornings no more then 3/10 to assist with return to PLOF. Eval: 5/10 pain level reported. Current: pain 5/10 upon arrival 12-20-22               3. Patient will report being able to sit for 1 hour with pain no more then 1/10 to increase ease with work duties. Eval: prolonged sitting (1 hour with pain level of 4/10)       PLAN  [x]  Upgrade activities as tolerated     []  Continue plan of care  []  Update interventions per flow sheet       []  Discharge due to:_  []  Other:_      Zainab Koenig, MPT, CMTPT 12/28/2022  8:47 AM    Future Appointments   Date Time Provider Mike Suárez   12/28/2022 10:30 AM Sara Merchant, PT [facility]   12/30/2022  9:30 AM Sara Merchant, PT [facility]   1/3/2023  7:30 AM Marquisrobbin Cohn, PT [facility]   1/6/2023  7:00 AM Marquisrobbin Cohn, PT [facility]

## 2022-12-30 ENCOUNTER — HOSPITAL ENCOUNTER (OUTPATIENT)
Dept: PHYSICAL THERAPY | Age: 42
End: 2022-12-30
Attending: FAMILY MEDICINE
Payer: COMMERCIAL

## 2022-12-30 PROCEDURE — 97110 THERAPEUTIC EXERCISES: CPT

## 2022-12-30 PROCEDURE — 97112 NEUROMUSCULAR REEDUCATION: CPT

## 2022-12-30 PROCEDURE — 97140 MANUAL THERAPY 1/> REGIONS: CPT

## 2022-12-30 NOTE — PROGRESS NOTES
PT DAILY TREATMENT NOTE 10-18    Patient Name: Bushra Gee  Date:2022  : 1980  [x]  Patient  Verified  Payor: Cory Hwang / Plan: Anila Saxena PPO / Product Type: PPO /    In time:928  Out time:1009  Total Treatment Time (min): 41  Visit #: 6 of 8    Treatment Area: Muscle spasm of back [M62.830]    SUBJECTIVE  Pain Level (0-10 scale): 1  Any medication changes, allergies to medications, adverse drug reactions, diagnosis change, or new procedure performed?: [x] No    [] Yes (see summary sheet for update)  Subjective functional status/changes:   [] No changes reported  It's feeling a lot better. The needling really helped. OBJECTIVE    27 min Therapeutic Exercise:  [x] See flow sheet :   Rationale: increase ROM and increase strength to improve the patients ability to perform daily activities      10 min Neuromuscular Re-education:  [x]  See flow sheet :   Rationale: increase strength  to improve the patients ability to recruit TA and glutes for daily activities     8 min Manual Therapy:  STM B L/S paraspinals in child's pose with stick and meat hook; prone grade 4 L/s PAs   The manual therapy interventions were performed at a separate and distinct time from the therapeutic activities interventions. Rationale: decrease pain, increase ROM, and increase tissue extensibility to relax mm for daily activities   With   [] TE   [] TA   [] neuro   [] other: Patient Education: [x] Review HEP    [] Progressed/Changed HEP based on:   [] positioning   [] body mechanics   [] transfers   [] heat/ice application    [] other:      Other Objective/Functional Measures:      Pain Level (0-10 scale) post treatment: 0    ASSESSMENT/Changes in Function: Significant improvement in pain after 2nd needling session. Added more core and hip exercises today to improve strength and stability. Felt some discomfort in prone hamstring curls, but was resolved by the end of treatment.      Patient will continue to benefit from skilled PT services to modify and progress therapeutic interventions, address functional mobility deficits, address ROM deficits, address strength deficits, analyze and address soft tissue restrictions, and analyze and cue movement patterns to attain remaining goals. []  See Plan of Care  []  See progress note/recertification  []  See Discharge Summary         Progress towards goals / Updated goals:  Short Term Goals: To be accomplished in 2 weeks:               1. Patient will be independent and compliant with HEP 1-2x/day to increase ease with ADLs safely. Eval: HEP established              Current: reports HEP compliance on 12-13-22               2. Patient will be able to perform 10 supine bridges with pain no more then 2/10 to increase ease with bed mobility. Eval: Supine bridge- full buttock clearance but 5-6/10 pain level reported   Current: able to perform 12 reps with minimal pain- goal met 12/28/22     Long Term Goals: To be accomplished in 4 weeks:               1. Patient will improve FOTO to at least 77 to demonstrate improved function. Eval; FOTO: 72               2. Patient will report average pain level in the mornings no more then 3/10 to assist with return to PLOF. Eval: 5/10 pain level reported. Current: pain 5/10 upon arrival 12-20-22; 1/10 on 12/30/22               3. Patient will report being able to sit for 1 hour with pain no more then 1/10 to increase ease with work duties.                Eval: prolonged sitting (1 hour with pain level of 4/10)    PLAN  [x]  Upgrade activities as tolerated     []  Continue plan of care  []  Update interventions per flow sheet       []  Discharge due to:_  []  Other:_      Eddie Liu, ROXANA, CMTPT 12/30/2022  8:59 AM    Future Appointments   Date Time Provider Mike Suárez   12/30/2022  9:30 AM Sydney Azevedo, PT [facility]   1/3/2023  7:30 [facility]   1/6/2023  7:00 AM Adeola Moss, Robert Capellan, PT [facility]

## 2023-01-03 ENCOUNTER — HOSPITAL ENCOUNTER (OUTPATIENT)
Dept: PHYSICAL THERAPY | Age: 43
Discharge: HOME | End: 2023-01-03
Attending: FAMILY MEDICINE
Payer: COMMERCIAL

## 2023-01-03 PROCEDURE — 97112 NEUROMUSCULAR REEDUCATION: CPT

## 2023-01-03 PROCEDURE — 97110 THERAPEUTIC EXERCISES: CPT

## 2023-01-03 NOTE — PROGRESS NOTES
PT DAILY TREATMENT NOTE     Patient Name: Graeme Smith  Date:1/3/2023  : 1980  [x]  Patient  Verified  Payor: Yelitza Conde / Plan: Ericka Moser PPO / Product Type: PPO /    In time:731  Out time:809  Total Treatment Time (min): 38  Visit #: 7 of 8    Treatment Area: Muscle spasm of back [M62.830]    SUBJECTIVE  Pain Level (0-10 scale): 1  Any medication changes, allergies to medications, adverse drug reactions, diagnosis change, or new procedure performed?: [x] No    [] Yes (see summary sheet for update)  Subjective functional status/changes:   [] No changes reported  The pt reports improvement with diminished pain and tightness. OBJECTIVE    15 min Therapeutic Exercise:  [x] See flow sheet :   Rationale: increase ROM and increase strength to improve the patients ability to perform ADL    23 min Neuromuscular Re-education:  [x]  See flow sheet :   Rationale: increase ROM and increase strength  to improve the patients ability to perform ADL      With   [] TE   [] TA   [] neuro   [] other: Patient Education: [x] Review HEP    [] Progressed/Changed HEP based on:   [] positioning   [] body mechanics   [] transfers   [] heat/ice application    [] other:      Other Objective/Functional Measures:  no changes     Pain Level (0-10 scale) post treatment: 1    ASSESSMENT/Changes in Function:  The pt is progressing with PT and demonstrates improved stability, diminished pain and tightness. Patient will continue to benefit from skilled PT services to modify and progress therapeutic interventions, address functional mobility deficits, address ROM deficits, address strength deficits, analyze and address soft tissue restrictions, analyze and cue movement patterns, and analyze and modify body mechanics/ergonomics to attain remaining goals.      []  See Plan of Care  []  See progress note/recertification  []  See Discharge Summary         Progress towards goals / Updated goals:  Short Term Goals: To be accomplished in 2 weeks:               1. Patient will be independent and compliant with HEP 1-2x/day to increase ease with ADLs safely. Eval: HEP established              Current: reports HEP compliance on 12-13-22               2. Patient will be able to perform 10 supine bridges with pain no more then 2/10 to increase ease with bed mobility. Eval: Supine bridge- full buttock clearance but 5-6/10 pain level reported   Current: able to perform 12 reps with minimal pain- goal met 12/28/22     Long Term Goals: To be accomplished in 4 weeks:               1. Patient will improve FOTO to at least 77 to demonstrate improved function. Eval; FOTO: 72               2. Patient will report average pain level in the mornings no more then 3/10 to assist with return to PLOF. Eval: 5/10 pain level reported. Current: pain 5/10 upon arrival 12-20-22; 1/10 on 12/30/22               3. Patient will report being able to sit for 1 hour with pain no more then 1/10 to increase ease with work duties.                Eval: prolonged sitting (1 hour with pain level of 4/10)    PLAN  []  Upgrade activities as tolerated     [x]  Continue plan of care  []  Update interventions per flow sheet       []  Discharge due to:_  []  Other:_      Mega Brenner PT 1/3/2023  7:40 AM    Future Appointments   Date Time Provider Mike Suárez   1/6/2023  7:00 AM Clinton Orono, PT [facility]   1/10/2023  8:00 AM Clinton Orono, PT [facility]   1/12/2023  7:30 AM Clinton Orono, PT [facility]   1/17/2023 10:00 AM Caren Apple, PT [facility]   1/19/2023  7:30 AM Clinton Orono, PT [facility]   1/23/2023  7:30 AM Clinton Orono, PT [facility]   1/26/2023  7:30 AM Clinton Orono, PT [facility]   1/30/2023  9:00 AM Caren Apple, PT [facility]   2/2/2023  2:30 PM Caren Apple, PT [facility]

## 2023-01-06 ENCOUNTER — HOSPITAL ENCOUNTER (OUTPATIENT)
Dept: PHYSICAL THERAPY | Age: 43
Discharge: HOME | End: 2023-01-06
Attending: FAMILY MEDICINE
Payer: COMMERCIAL

## 2023-01-06 PROCEDURE — 97110 THERAPEUTIC EXERCISES: CPT

## 2023-01-06 PROCEDURE — 97140 MANUAL THERAPY 1/> REGIONS: CPT

## 2023-01-06 PROCEDURE — 20560 NDL INSJ W/O NJX 1 OR 2 MUSC: CPT

## 2023-01-06 NOTE — PROGRESS NOTES
PT DAILY TREATMENT NOTE     Patient Name: Yfn Forte  Date:2023  : 1980  [x]  Patient  Verified  Payor: [employer] / Plan: Phylliss Dancer PPO / Product Type: PPO /    In time:0700  Out time:07  Total Treatment Time (min): 46  Visit #: 8 of 8      Treatment Area: Muscle spasm of back [M62.830]    SUBJECTIVE  Pain Level (0-10 scale): 1-2  Any medication changes, allergies to medications, adverse drug reactions, diagnosis change, or new procedure performed?: [x] No    [] Yes (see summary sheet for update)  Subjective functional status/changes:   [] No changes reported  The pt reports about 70% improvement since starting PT.     OBJECTIVE    Modality rationale: decrease inflammation and decrease pain to improve the patients ability to perform ADL   Min Type Additional Details    [] Estim:  []Unatt       []IFC  []Premod                        []Other:  []w/ice   []w/heat  Position:  Location:    [] Estim: []Att    []TENS instruct  []NMES                    []Other:  []w/US   []w/ice   []w/heat  Position:  Location:    []  Traction: [] Cervical       []Lumbar                       [] Prone          []Supine                       []Intermittent   []Continuous Lbs:  [] before manual  [] after manual    []  Ultrasound: []Continuous   [] Pulsed                           []1MHz   []3MHz W/cm2:  Location:    []  Iontophoresis with dexamethasone         Location: [] Take home patch   [] In clinic   10 [x]  Ice     []  heat  []  Ice massage  []  Laser   []  Anodyne Position:seated  Location:lumbar    []  Laser with stim  []  Other:  Position:  Location:    []  Vasopneumatic Device    []  Right     []  Left  Pre-treatment girth:  Post-treatment girth:  Measured at (location):  Pressure:       [] lo [] med [] hi   Temperature: [] lo [] med [] hi   [] Skin assessment post-treatment:  []intact []redness- no adverse reaction    []redness - adverse reaction:     11 min Therapeutic Exercise:  [x] See flow sheet :Dynamic Warm-up    Rationale: increase ROM and increase strength to improve the patients ability to perform ADL    23 min Manual Therapy:  IMDN to include education, assessment, set-up, palpation, hemostasis, STM/stretch to treated areas and reassessment only     The manual therapy interventions were performed at a separate and distinct time from the therapeutic activities interventions. Rationale: decrease pain, increase ROM, and increase tissue extensibility to improve ability for functional tasks. 2 min Dry Needling:   [x]  CPT 23002:  needle insertion(s) without injection(s); 1 or 2 muscle(s)  []  CPT 36896:  needle insertion(s) without injection(s); 3 or more muscles. Rationale: decrease pain, increase ROM, increase tissue extensibility, and decrease trigger points to perform functional activities    Dry Needling Procedure Note    Procedure: An intramuscular manual therapy (dry needling) and a neuro-muscular re-education treatment was done to deactivate myofascial trigger points with a 30 gauge filament needle under aseptic technique.     Indications:  [x] Myofascial pain and dysfunction [] Muscled spasms  [] Myalgia/myositis   [] Muscle cramps  [] Muscle imbalances  [] Other:    Chart reviewed for the following:  ISalvador, PT, have reviewed the medical history, summary list and precautions/contraindications for [address] performed immediately prior to start of procedure:  Salvador BRICENO PT, have performed the following reviews on Shannan Burgess prior to the start of the session:      [x] Verified patient identification by name and date of birth    [x] Agreement on all muscles being treated was verified   [x] Purpose of dry needling, side effects, possible complications, risks and benefits were explained to the patient   [x] Procedure site(s) verified  [x] Patient was positioned for comfort and draped for privacy  [x] Informed Consent was signed (initial visit) and verified verbally (subsequent visits)  [x] Patient was instructed on the need to report the use of blood thinners and/or immunosuppressant medications  [x] How to respond to possible adverse effects of treatment  [x] Self treatment of post needling soreness: ice, heat (moist heat, heat wraps) and stretching  [x] Opportunity was given to ask any questions, all questions were answered            Time: 0700  Date of procedure: 1/6/2023    Treatment: The following muscles were treated today with intramuscular dry needling  [] Left [] Right Abdominals: Ant Rectus Abdominis  [] Left [] Right External Oblique / Internal Oblique / Transverse Abdominis  [] Left [] Right Thoracic Multifidi / Rotatores  [x] Left [x] Right Iliocostalis Thoracis / Lumborum  [x] Left [x] Right Longissimus Thoracis / Lumborum  [x] Left [x] Right Lumbar Multifidi  [] Left [] Right Serratus Posterior Inferior  [] Left [] Right Quadratus Lumborum  [] Left [] Right Psoas  [] Left [] Right Iliacus  [] Left [] Right Iliopsoas (inguinal)  [] Left [] Right Piriformis  [] Left [] Right Quadratus Femoris  [] Left [] Right Sirena Buchanan / Deborah Dimas / Carolina Montesinos  [] Left [] Right Obturator Internus  [] Left [] Right Obturator Externus / [place] / Inferior Gemellus    [] Left [] Right Tensor Fasciae Annie  [] Left [] Right Iliotibial Band  [] Left [] Right Pectineus  [] Left [] Right Sartorius  [] Left [] Right Gracilis  [] Left [] Right Adductor Brevis / Adductor Longus / Adductor Orren Boning  [] Left [] Right Rectus Femoris / Vastus Lateralis / Vastus Intermedius / Vastus Medialis Obliquus  [] Left [] Right Tibialis Anterior / Posterior  [] Left [] Right Extensor Digitorum Longus / Brevis  [] Left [] Right Extensor Hallucis Longus / Brevis  [] Left [] Right Hamstrings: Biceps Femoris / Semitendinosis / Semimembranosis  [] Left [] Right Popliteus / Planteris  [] Left [] Right Gastrocnemius Medial / Lateral  [] Left [] Right Soleus  [] Left [] Right Peronei: Longus / Alben Angie / Tertius  [] Left [] Right Flexor Digitorum Longus / Brevis  [] Left [] Right Flexor Hallucis Longus / Brevis  [] Left [] Right Quadratus Plantae  [] Left [] Right Abductor Digiti Minimi  [] Left [] Right Foot Interossei  [] Left [] Right Other:    Patient's response to today's treatment:  [x] Latent Twitch Response  [x] Muscle relaxation [x] Pain Relief  [] Post needling soreness [x] without complications  [] Increased Range of Motion  [] Other:     Performed by: Arsen Ochoa, PT              With   [] TE   [] TA   [] neuro   [] other: Patient Education: [x] Review HEP    [] Progressed/Changed HEP based on:   [] positioning   [] body mechanics   [] transfers   [] heat/ice application    [] other:      Other Objective/Functional Measures: performed and provided dynamic warm-up exercises for pt to complete prior to gym workouts      Pain Level (0-10 scale) post treatment: 1    ASSESSMENT/Changes in Function: The pt is progressing well with PT and reports 70% improvement since start of care. He will benefit from continued PT to further his current progress. Patient will continue to benefit from skilled PT services to modify and progress therapeutic interventions, address functional mobility deficits, address ROM deficits, address strength deficits, analyze and address soft tissue restrictions, and analyze and cue movement patterns to attain remaining goals. []  See Plan of Care  [x]  See progress note/recertification  []  See Discharge Summary         Progress towards goals / Updated goals:  Short Term Goals: To be accomplished in 2 weeks:               1. Patient will be independent and compliant with HEP 1-2x/day to increase ease with ADLs safely.                Eval: HEP established              Current: reports HEP compliance on 12-13-22               2. Patient will be able to perform 10 supine bridges with pain no more then 2/10 to increase ease with bed mobility. Eval: Supine bridge- full buttock clearance but 5-6/10 pain level reported   Current: able to perform 12 reps with minimal pain- goal met 12/28/22     Long Term Goals: To be accomplished in 4 weeks:               1. Patient will improve FOTO to at least 77 to demonstrate improved function. Eval; FOTO: 72               2. Patient will report average pain level in the mornings no more than 3/10 to assist with return to PLOF. Eval: 5/10 pain level reported. Current: Goal MET               3. Patient will report being able to sit for 1 hour with pain no more then 1/10 to increase ease with work duties. Eval: prolonged sitting (1 hour with pain level of 4/10)   Current: progressing but >1/10 at times.    PLAN  []  Upgrade activities as tolerated     [x]  Continue plan of care  []  Update interventions per flow sheet       []  Discharge due to:_  []  Other:_      Zoila Luna, PT 1/6/2023  7:04 AM    Future Appointments   Date Time Provider Mike Suárez   1/10/2023  8:00 AM Cleatus Tyshawn, PT [facility]   1/12/2023  7:30 AM Cleatus Tyshawn, PT [facility]   1/17/2023 10:00 AM Anh Hitch, PT [facility]   1/19/2023  7:30 AM Cleatus Tyshawn, PT [facility]   1/23/2023  7:30 AM Cleatus Tyshawn, PT [facility]   1/26/2023  7:30 AM Cleatus Tyshawn, PT [facility]   1/30/2023  9:00 AM Anh Hitch, PT [facility]   2/2/2023  2:30 PM Anh Hitch, PT [facility]

## 2023-01-06 NOTE — PROGRESS NOTES
[facility]  [address]  [place], [address]  [phone] fax    Physical Therapy Progress Note  Patient name: Socorro Segal Start of Care: 2022   Referral source: Ramesh Mcneill MD : 1980                Medical Diagnosis: Muscle spasm of back [M62.830]  Payor: [employer] / [facility] [employer] [facility] / Product Type: PPO /  Onset Date: 22                Treatment Diagnosis: LBP    Prior Hospitalization: see medical history Provider#: [id number]   Medications: Verified on Patient summary List    Comorbidities: Arthritis, back pain, uses CPAP    Prior Level of Function: Patient stated he enjoys working out and is working out 3x/week at a cross fit type of gym. Patient stated working out typically provides relief. Visits from Start of Care: 8    Missed Visits: 0        Key Functional Changes: The pt is progressing well with PT and reports 70% improvement since start of care. He will benefit from continued PT to further his current progress. Progress towards goals / Updated goals:  Short Term Goals: To be accomplished in 2 weeks:               1. Patient will be independent and compliant with HEP 1-2x/day to increase ease with ADLs safely. Eval: HEP established              Current: reports HEP compliance on 22               2. Patient will be able to perform 10 supine bridges with pain no more then 2/10 to increase ease with bed mobility. Eval: Supine bridge- full buttock clearance but 5-6/10 pain level reported   Current: able to perform 12 reps with minimal pain- goal met 22     Long Term Goals: To be accomplished in 4 weeks:               1. Patient will improve FOTO to at least 77 to demonstrate improved function. Eval; FOTO: 72               2. Patient will report average pain level in the mornings no more than 3/10 to assist with return to PLOF. Eval: 5/10 pain level reported. Current: Goal MET               3. Patient will report being able to sit for 1 hour with pain no more then 1/10 to increase ease with work duties. Eval: prolonged sitting (1 hour with pain level of 4/10)              Current: progressing but >1/10 at times. Updated Goals: to be achieved in 4 weeks:      1. Patient will improve FOTO to at least 77 to demonstrate improved function. PN: 72               2. Patient will report being able to sit for 1 hour with pain no more then 1/10 to increase ease with work duties. PN: progressing but >1/10 at times. 3. The pt will report no limitation with performing recreational activities. PN: a little difficulty    ASSESSMENT/RECOMMENDATIONS:  [x]Continue therapy per initial plan/protocol at a frequency of  2 x per week for 4 weeks  []Continue therapy with the following recommended changes:_____________________      _____________________________________________________________________  []Discontinue therapy progressing towards or have reached established goals  []Discontinue therapy due to lack of appreciable progress towards goals  []Discontinue therapy due to lack of attendance or compliance  []Await Physician's recommendations/decisions regarding therapy  []Other:________________________________________________________________    Thank you for this referral.    Kishan Yu, PT 1/6/2023 8:43 AM  NOTE TO PHYSICIAN:  PLEASE COMPLETE THE ORDERS BELOW AND   FAX TO [facility]: [phone]  If you are unable to process this request in 24 hours please contact our office: [phone]    I have read the above report and request that my patient continue as recommended.   I have read the above report and request that my patient continue therapy with the following changes/special instructions:__________________________________________________________  I have read the above report and request that my patient be discharged from therapy.     Physicians signature: ______________________________Date: ______Time:______     Sabina Alvares MD

## 2023-01-10 ENCOUNTER — HOSPITAL ENCOUNTER (OUTPATIENT)
Dept: PHYSICAL THERAPY | Age: 43
Discharge: HOME | End: 2023-01-10
Attending: FAMILY MEDICINE
Payer: COMMERCIAL

## 2023-01-10 PROCEDURE — 97140 MANUAL THERAPY 1/> REGIONS: CPT

## 2023-01-10 PROCEDURE — 97112 NEUROMUSCULAR REEDUCATION: CPT

## 2023-01-10 NOTE — PROGRESS NOTES
PT DAILY TREATMENT NOTE     Patient Name: Sandra Orr  Date:1/10/2023  : 1980  [x]  Patient  Verified  Payor: Kevin [employer] / Plan: Ileana Amor PPO / Product Type: PPO /    In time:0800  Out [id number]  Total Treatment Time (min): 35  Visit #: 1 of 8      Treatment Area: Muscle spasm of back [M62.830]    SUBJECTIVE  Pain Level (0-10 scale): 3  Any medication changes, allergies to medications, adverse drug reactions, diagnosis change, or new procedure performed?: [x] No    [] Yes (see summary sheet for update)  Subjective functional status/changes:   [] No changes reported  The pt reports some pain increase today.      OBJECTIVE    Modality rationale:    Min Type Additional Details    [] Estim:  []Unatt       []IFC  []Premod                        []Other:  []w/ice   []w/heat  Position:  Location:    [] Estim: []Att    []TENS instruct  []NMES                    []Other:  []w/US   []w/ice   []w/heat  Position:  Location:    []  Traction: [] Cervical       []Lumbar                       [] Prone          []Supine                       []Intermittent   []Continuous Lbs:  [] before manual  [] after manual    []  Ultrasound: []Continuous   [] Pulsed                           []1MHz   []3MHz W/cm2:  Location:    []  Iontophoresis with dexamethasone         Location: [] Take home patch   [] In clinic    []  Ice     []  heat  []  Ice massage  []  Laser   []  Anodyne Position:  Location:    []  Laser with stim  []  Other:  Position:  Location:    []  Vasopneumatic Device    []  Right     []  Left  Pre-treatment girth:  Post-treatment girth:  Measured at (location):  Pressure:       [] lo [] med [] hi   Temperature: [] lo [] med [] hi   [] Skin assessment post-treatment:  []intact []redness- no adverse reaction    []redness - adverse reaction:     8 min Therapeutic Exercise:  [x] See flow sheet :   Rationale: increase ROM and increase strength to improve the patients ability to perform ADL       17 min Neuromuscular Re-education:  [x]  See flow sheet :   Rationale: increase strength, improve coordination, and increase proprioception  to improve the patients ability to perform ADL    10 min Manual Therapy:  Graston with GT 4 to B lumbar paraspinals with pt prone   The manual therapy interventions were performed at a separate and distinct time from the therapeutic activities interventions. Rationale: decrease pain, increase ROM, and increase tissue extensibility to improve ability for daily tasks      With   [] TE   [] TA   [] neuro   [] other: Patient Education: [x] Review HEP    [] Progressed/Changed HEP based on:   [] positioning   [] body mechanics   [] transfers   [] heat/ice application    [] other:      Other Objective/Functional Measures:       Pain Level (0-10 scale) post treatment: 0    ASSESSMENT/Changes in Function:  Good response to Graston today with no pain post treatment despite pain increase upon arrival.     Patient will continue to benefit from skilled PT services to modify and progress therapeutic interventions, address functional mobility deficits, address ROM deficits, address strength deficits, analyze and address soft tissue restrictions, analyze and cue movement patterns, analyze and modify body mechanics/ergonomics, and assess and modify postural abnormalities to attain remaining goals. []  See Plan of Care  []  See progress note/recertification  []  See Discharge Summary         Progress towards goals / Updated goals:  Updated Goals: to be achieved in 4 weeks:                 1. Patient will improve FOTO to at least 77 to demonstrate improved function. PN: 72               2. Patient will report being able to sit for 1 hour with pain no more then 1/10 to increase ease with work duties. PN: progressing but >1/10 at times. 3. The pt will report no limitation with performing recreational activities.                PN: a little difficulty    PLAN  []  Upgrade activities as tolerated     []  Continue plan of care  []  Update interventions per flow sheet       []  Discharge due to:_  []  Other:_      Godwin Groves, PT 1/10/2023  8:01 AM    Future Appointments   Date Time Provider Mike Suárez   1/12/2023  7:30 AM Earnstine Lusty, PT [facility]   1/17/2023 10:00 AM Laura Stanislav, PT [facility]   1/19/2023  7:30 AM Earnstine Lusty, PT [facility]   1/23/2023  7:30 AM Earnstine Lusty, PT [facility]   1/26/2023  7:30 AM Earnstine Lusty, PT [facility]   1/30/2023  9:00 AM Laura Colorado, PT [facility]   2/2/2023  2:30 PM Lauramoustapha Colorado, PT [facility]

## 2023-01-12 ENCOUNTER — HOSPITAL ENCOUNTER (OUTPATIENT)
Dept: PHYSICAL THERAPY | Age: 43
Discharge: HOME | End: 2023-01-12
Attending: FAMILY MEDICINE
Payer: COMMERCIAL

## 2023-01-12 PROCEDURE — 20560 NDL INSJ W/O NJX 1 OR 2 MUSC: CPT

## 2023-01-12 PROCEDURE — 97110 THERAPEUTIC EXERCISES: CPT

## 2023-01-12 PROCEDURE — 97140 MANUAL THERAPY 1/> REGIONS: CPT

## 2023-01-12 NOTE — PROGRESS NOTES
PT DAILY TREATMENT NOTE     Patient Name: Shabnam Back  Date:2023  : 1980  [x]  Patient  Verified  Payor: Efrain Valdezacy / Plan: Richy Galeana PPO / Product Type: PPO /    In time:0730  Out HYTO:[id number]  Total Treatment Time (min): 47  Visit #: 2 of 8    Treatment Area: Muscle spasm of back [M62.830]    SUBJECTIVE  Pain Level (0-10 scale): 0  Any medication changes, allergies to medications, adverse drug reactions, diagnosis change, or new procedure performed?: [x] No    [] Yes (see summary sheet for update)  Subjective functional status/changes:   [] No changes reported  The pt reports he did well after last session. No c/o pain currently.      OBJECTIVE    Modality rationale: decrease inflammation to improve the patients ability to perform ADL   Min Type Additional Details    [] Estim:  []Unatt       []IFC  []Premod                        []Other:  []w/ice   []w/heat  Position:  Location:    [] Estim: []Att    []TENS instruct  []NMES                    []Other:  []w/US   []w/ice   []w/heat  Position:  Location:    []  Traction: [] Cervical       []Lumbar                       [] Prone          []Supine                       []Intermittent   []Continuous Lbs:  [] before manual  [] after manual    []  Ultrasound: []Continuous   [] Pulsed                           []1MHz   []3MHz W/cm2:  Location:    []  Iontophoresis with dexamethasone         Location: [] Take home patch   [] In clinic   10 [x]  Ice     []  heat  []  Ice massage  []  Laser   []  Anodyne Position:seated   Location:lumbar    []  Laser with stim  []  Other:  Position:  Location:    []  Vasopneumatic Device    []  Right     []  Left  Pre-treatment girth:  Post-treatment girth:  Measured at (location):  Pressure:       [] lo [] med [] hi   Temperature: [] lo [] med [] hi   [] Skin assessment post-treatment:  []intact []redness- no adverse reaction    []redness - adverse reaction:     17 min Therapeutic Exercise:  [x] See flow sheet :   Rationale: increase ROM and increase strength to improve the patients ability to perform ADL       24 min Manual Therapy:  IMDN to include education, assessment, set-up, palpation, hemostasis, STM/stretch to treated areas and reassessment only     The manual therapy interventions were performed at a separate and distinct time from the therapeutic activities interventions. Rationale: decrease pain, increase ROM, increase tissue extensibility, and decrease trigger points to perform functional tasks. 3 min Dry Needling:   [x]  CPT 38653:  needle insertion(s) without injection(s); 1 or 2 muscle(s)  []  CPT 59575:  needle insertion(s) without injection(s); 3 or more muscles. Rationale: decrease pain, increase ROM, increase tissue extensibility, and decrease trigger points to perform ADL    Dry Needling Procedure Note    Procedure: An intramuscular manual therapy (dry needling) and a neuro-muscular re-education treatment was done to deactivate myofascial trigger points with a 30 gauge filament needle under aseptic technique.     Indications:  [] Myofascial pain and dysfunction [x] Muscled spasms  [] Myalgia/myositis   [] Muscle cramps  [] Muscle imbalances  [] Other:    Chart reviewed for the following:  IToby PT, have reviewed the medical history, summary list and precautions/contraindications for [address] performed immediately prior to start of procedure:  Toby BRICENO PT, have performed the following reviews on Shannan Burgess prior to the start of the session:      [x] Verified patient identification by name and date of birth    [x] Agreement on all muscles being treated was verified   [x] Purpose of dry needling, side effects, possible complications, risks and benefits were explained to the patient   [x] Procedure site(s) verified  [x] Patient was positioned for comfort and draped for privacy  [x] Informed Consent was signed (initial visit) and verified verbally (subsequent visits)  [x] Patient was instructed on the need to report the use of blood thinners and/or immunosuppressant medications  [x] How to respond to possible adverse effects of treatment  [x] Self treatment of post needling soreness: ice, heat (moist heat, heat wraps) and stretching  [x] Opportunity was given to ask any questions, all questions were answered            Time: 0735  Date of procedure: 1/12/2023    Treatment: The following muscles were treated today with intramuscular dry needling  [] Left [] Right Abdominals: Ant Rectus Abdominis  [] Left [] Right External Oblique / Internal Oblique / Transverse Abdominis  [] Left [] Right Thoracic Multifidi / Yoana Dec  [] Left [] Right Iliocostalis Thoracis / Lenny Tierra  [x] Left [x] Right Longissimus Thoracis / Lumborum  [x] Left [x] Right Lumbar Multifidi  [] Left [] Right Serratus Posterior Inferior  [] Left [] Right Quadratus Lumborum  [] Left [] Right Psoas  [] Left [] Right Iliacus  [] Left [] Right Iliopsoas (inguinal)  [] Left [] Right Piriformis  [] Left [] Right Quadratus Femoris  [] Left [] Right Josie Hanson / Triny Glasgow / Patricio Moses  [] Left [] Right Obturator Internus  [] Left [] Right Obturator Externus / Randine Carrel / Inferior Gemellus    [] Left [] Right Tensor Fasciae Annie  [] Left [] Right Iliotibial Band  [] Left [] Right Pectineus  [] Left [] Right Sartorius  [] Left [] Right Gracilis  [] Left [] Right Adductor Brevis / Adductor Longus / Drena Linger  [] Left [] Right Rectus Femoris / Vastus Lateralis / Vastus Intermedius / Vastus Medialis Obliquus  [] Left [] Right Tibialis Anterior / Posterior  [] Left [] Right Extensor Digitorum Longus / Brevis  [] Left [] Right Extensor Hallucis Longus / Brevis  [] Left [] Right Hamstrings: Biceps Femoris / Semitendinosis / Semimembranosis  [] Left [] Right Popliteus / Planteris  [] Left [] Right Gastrocnemius Medial / Lateral  [] Left [] Right Soleus  [] Left [] Right Peronei: Parker Marroquin / Craig Matute / Alma Nelson  [] Left [] Right Flexor Digitorum Longus / Brevis  [] Left [] Right Flexor Hallucis Longus / Brevis  [] Left [] Right Quadratus Plantae  [] Left [] Right Abductor Digiti Minimi  [] Left [] Right Foot Interossei  [] Left [] Right Other:    Patient's response to today's treatment:  [x] Latent Twitch Response  [x] Muscle relaxation [] Pain Relief  [x] Post needling soreness [x] without complications  [] Increased Range of Motion  [] Other:     Performed by: Joel Botello, PT            With   [] TE   [] TA   [] neuro   [] other: Patient Education: [x] Review HEP    [] Progressed/Changed HEP based on:   [] positioning   [] body mechanics   [] transfers   [] heat/ice application    [] other:      Other Objective/Functional Measures: + post needle soreness     Pain Level (0-10 scale) post treatment: 2    ASSESSMENT/Changes in Function:  The pt is showing progress with DN and Graston but does still have pain. He demonstrates progress with tolerance to his daily and recreational activities. Patient will continue to benefit from skilled PT services to modify and progress therapeutic interventions, address functional mobility deficits, address ROM deficits, address strength deficits, analyze and address soft tissue restrictions, analyze and cue movement patterns, and analyze and modify body mechanics/ergonomics to attain remaining goals. []  See Plan of Care  []  See progress note/recertification  []  See Discharge Summary         Progress towards goals / Updated goals:  Updated Goals: to be achieved in 4 weeks:                 1. Patient will improve FOTO to at least 77 to demonstrate improved function. PN: 72               2. Patient will report being able to sit for 1 hour with pain no more then 1/10 to increase ease with work duties. PN: progressing but >1/10 at times.     Current: reports improvement but >1 at times 01-12-23              3. The pt will report no limitation with performing recreational activities.                PN: a little difficulty       PLAN  [x]  Upgrade activities as tolerated     [x]  Continue plan of care  []  Update interventions per flow sheet       []  Discharge due to:_  []  Other:_      David Dawson, PT 1/12/2023  7:43 AM    Future Appointments   Date Time Provider Mike Suárez   1/17/2023 10:00 AM Blinda Pasquale, PT [facility]   1/19/2023  7:30 AM Jay Sartorius, PT [facility]   1/23/2023  7:30 AM Jay Sartorius, PT [facility]   1/26/2023  7:30 AM Jay Sartorius, PT [facility]   1/30/2023  9:00 AM Blinda Magyar, PT [facility]   2/2/2023  2:30 PM Blinda Magyar, PT [facility]

## 2023-01-17 ENCOUNTER — HOSPITAL ENCOUNTER (OUTPATIENT)
Dept: PHYSICAL THERAPY | Age: 43
Discharge: HOME | End: 2023-01-17
Attending: FAMILY MEDICINE
Payer: COMMERCIAL

## 2023-01-17 PROCEDURE — 97112 NEUROMUSCULAR REEDUCATION: CPT

## 2023-01-17 PROCEDURE — 97110 THERAPEUTIC EXERCISES: CPT

## 2023-01-17 NOTE — PROGRESS NOTES
PT DAILY TREATMENT NOTE 10-18    Patient Name: Miki Key  Date:2023  : 1980  [x]  Patient  Verified  Payor: Pérez Muller / Plan: Asim Hodge PPO / Product Type: PPO /    In time:1001  Out time:1042  Total Treatment Time (min): 41  Visit #: 3 of 8  Treatment Area: Muscle spasm of back [M62.830]    SUBJECTIVE  Pain Level (0-10 scale): 0  Any medication changes, allergies to medications, adverse drug reactions, diagnosis change, or new procedure performed?: [x] No    [] Yes (see summary sheet for update)  Subjective functional status/changes:   [] No changes reported  Feeling good. OBJECTIVE    26 min Therapeutic Exercise:  [] See flow sheet :   Rationale: increase ROM and increase strength to improve the patients ability to perform daily activities      15 min Neuromuscular Re-education:  []  See flow sheet :   Rationale: increase strength  to improve the patients ability to recruit TA and glutes for daily activities     With   [] TE   [] TA   [] neuro   [] other: Patient Education: [x] Review HEP    [] Progressed/Changed HEP based on:   [] positioning   [] body mechanics   [] transfers   [] heat/ice application    [] other:      Other Objective/Functional Measures: FOTO 94     Pain Level (0-10 scale) post treatment: 0    ASSESSMENT/Changes in Function: 22 point gain in FOTO. Minimal cueing required for TA recruitment. Patient will continue to benefit from skilled PT services to modify and progress therapeutic interventions, address functional mobility deficits, address strength deficits, analyze and address soft tissue restrictions, and analyze and cue movement patterns to attain remaining goals. []  See Plan of Care  []  See progress note/recertification  []  See Discharge Summary         Progress towards goals / Updated goals:            1. Patient will improve FOTO to at least 77 to demonstrate improved function.                PN: 72   Current:94 goal met 23 2. Patient will report being able to sit for 1 hour with pain no more then 1/10 to increase ease with work duties. PN: progressing but >1/10 at times. Current: reports improvement but >1 at times 01-12-23              3. The pt will report no limitation with performing recreational activities.                PN: a little difficulty       PLAN  [x]  Upgrade activities as tolerated     []  Continue plan of care  []  Update interventions per flow sheet       []  Discharge due to:_  []  Other:_      Alyson Cristobal, MPT, CMTPT 1/17/2023  8:39 AM    Future Appointments   Date Time Provider Mike Suárez   1/17/2023 10:00 AM [place], PT [facility]   1/19/2023  7:30 AM Debbe Adrian, PT [facility]   1/23/2023  7:30 AM Debbe Adrian, PT [facility]   1/26/2023  7:30 AM Debbe Adrian, PT [facility]   1/30/2023  9:00 AM [place], PT [facility]   2/2/2023  2:30 PM [place], PT [facility]

## 2023-01-19 ENCOUNTER — HOSPITAL ENCOUNTER (OUTPATIENT)
Dept: PHYSICAL THERAPY | Age: 43
Discharge: HOME | End: 2023-01-19
Attending: FAMILY MEDICINE
Payer: COMMERCIAL

## 2023-01-19 PROCEDURE — 20560 NDL INSJ W/O NJX 1 OR 2 MUSC: CPT

## 2023-01-19 PROCEDURE — 97140 MANUAL THERAPY 1/> REGIONS: CPT

## 2023-01-19 PROCEDURE — 97110 THERAPEUTIC EXERCISES: CPT

## 2023-01-19 NOTE — PROGRESS NOTES
PT DAILY TREATMENT NOTE     Patient Name: Annette Arroyo  Date:2023  : 1980  [x]  Patient  Verified  Payor: Jim Rangel / Plan: Aftab Menchaca PPO / Product Type: PPO /    In QYT  Out ZBEX:[id number]  Total Treatment Time (min): 42  Visit #: 4 of 8      Treatment Area: Muscle spasm of back [M62.830]    SUBJECTIVE  Pain Level (0-10 scale): 0  Any medication changes, allergies to medications, adverse drug reactions, diagnosis change, or new procedure performed?: [x] No    [] Yes (see summary sheet for update)  Subjective functional status/changes:   [] No changes reported  The pt reports he has been doing better.      OBJECTIVE    Modality rationale: decrease inflammation to improve the patients ability to perform ADL   Min Type Additional Details    [] Estim:  []Unatt       []IFC  []Premod                        []Other:  []w/ice   []w/heat  Position:  Location:    [] Estim: []Att    []TENS instruct  []NMES                    []Other:  []w/US   []w/ice   []w/heat  Position:  Location:    []  Traction: [] Cervical       []Lumbar                       [] Prone          []Supine                       []Intermittent   []Continuous Lbs:  [] before manual  [] after manual    []  Ultrasound: []Continuous   [] Pulsed                           []1MHz   []3MHz W/cm2:  Location:    []  Iontophoresis with dexamethasone         Location: [] Take home patch   [] In clinic   10 [x]  Ice     []  heat  []  Ice massage  []  Laser   []  Anodyne Position:prone  Location:lumbar    []  Laser with stim  []  Other:  Position:  Location:    []  Vasopneumatic Device    []  Right     []  Left  Pre-treatment girth:  Post-treatment girth:  Measured at (location):  Pressure:       [] lo [] med [] hi   Temperature: [] lo [] med [] hi   [] Skin assessment post-treatment:  []intact []redness- no adverse reaction    []redness - adverse reaction:         15 min Therapeutic Exercise:  [x] See flow sheet :   Rationale: increase ROM and increase strength to improve the patients ability to perform ADL. 15 min Manual Therapy:  IMDN to include education, assessment, set-up, palpation, hemostasis, STM/stretch to treated areas and reassessment only     The manual therapy interventions were performed at a separate and distinct time from the therapeutic activities interventions. Rationale: increase ROM, increase tissue extensibility, and decrease trigger points to perform functional tasks. 2 min Dry Needling:   [x]  CPT 94730:  needle insertion(s) without injection(s); 1 or 2 muscle(s)  []  CPT 03205:  needle insertion(s) without injection(s); 3 or more muscles. Rationale: increase ROM, increase tissue extensibility, and decrease trigger points to perform functional tasks. Dry Needling Procedure Note    Procedure: An intramuscular manual therapy (dry needling) and a neuro-muscular re-education treatment was done to deactivate myofascial trigger points with a 30 gauge filament needle under aseptic technique.     Indications:  [x] Myofascial pain and dysfunction [] Muscled spasms  [] Myalgia/myositis   [] Muscle cramps  [x] Muscle imbalances  [] Other:    Chart reviewed for the following:  IBala, PT, have reviewed the medical history, summary list and precautions/contraindications for [address] performed immediately prior to start of procedure:  Bala BRICENO PT, have performed the following reviews on Shannan Burgess prior to the start of the session:      [x] Verified patient identification by name and date of birth    [x] Agreement on all muscles being treated was verified   [x] Purpose of dry needling, side effects, possible complications, risks and benefits were explained to the patient   [x] Procedure site(s) verified  [x] Patient was positioned for comfort and draped for privacy  [x] Informed Consent was signed (initial visit) and verified verbally (subsequent visits)  [x] Patient was instructed on the need to report the use of blood thinners and/or immunosuppressant medications  [x] How to respond to possible adverse effects of treatment  [x] Self treatment of post needling soreness: ice, heat (moist heat, heat wraps) and stretching  [x] Opportunity was given to ask any questions, all questions were answered            Time: 0738  Date of procedure: 1/19/2023    Treatment: The following muscles were treated today with intramuscular dry needling  [] Left [] Right Abdominals: Ant Rectus Abdominis  [] Left [] Right External Oblique / Internal Oblique / Transverse Abdominis  [] Left [] Right Thoracic Multifidi / Rotatores  [x] Left [x] Right Iliocostalis Thoracis / Flowers Butter  [x] Left [x] Right Longissimus Thoracis / Lumborum  [x] Left [x] Right Lumbar Multifidi  [] Left [] Right Serratus Posterior Inferior  [] Left [] Right Quadratus Lumborum  [] Left [] Right Psoas  [] Left [] Right Iliacus  [] Left [] Right Iliopsoas (inguinal)  [] Left [] Right Piriformis  [] Left [] Right Quadratus Femoris  [] Left [] Right Elgie Nay / Epimenio [place] / Arvilla Jase  [] Left [] Right Obturator Internus  [] Left [] Right Obturator Externus / Soto Hurry / Inferior Gemellus    [] Left [] Right Tensor Fasciae Annie  [] Left [] Right Iliotibial Band  [] Left [] Right Pectineus  [] Left [] Right Sartorius  [] Left [] Right Gracilis  [] Left [] Right Adductor Brevis / Adductor Longus / Kathey Labrum  [] Left [] Right Rectus Femoris / Vastus Lateralis / Vastus Intermedius / Vastus Medialis Obliquus  [] Left [] Right Tibialis Anterior / Posterior  [] Left [] Right Extensor Digitorum Longus / Brevis  [] Left [] Right Extensor Hallucis Longus / Brevis  [] Left [] Right Hamstrings: Biceps Femoris / Semitendinosis / Semimembranosis  [] Left [] Right Popliteus / Planteris  [] Left [] Right Gastrocnemius Medial / Lateral  [] Left [] Right Soleus  [] Left [] Right Peronei: Albert Good / Asia Swartz / Tertius  [] Left [] Right Flexor Digitorum Longus / Brevis  [] Left [] Right Flexor Hallucis Longus / Brevis  [] Left [] Right Quadratus Plantae  [] Left [] Right Abductor Digiti Minimi  [] Left [] Right Foot Interossei  [] Left [] Right Other:    Patient's response to today's treatment:  [x] Latent Twitch Response  [x] Muscle relaxation [] Pain Relief  [] Post needling soreness [x] without complications  [] Increased Range of Motion  [] Other:     Performed by: Pina Yu, PT    With   [] TE   [] TA   [] neuro   [] other: Patient Education: [x] Review HEP    [] Progressed/Changed HEP based on:   [] positioning   [] body mechanics   [] transfers   [] heat/ice application    [] other:      Other Objective/Functional Measures: no changes     Pain Level (0-10 scale) post treatment: 0    ASSESSMENT/Changes in Function:  The pt is progressing well with PT and reports diminished overall pain and tightness. Myofascial restrictions with noted improvement with DN. Patient will continue to benefit from skilled PT services to modify and progress therapeutic interventions, address functional mobility deficits, address ROM deficits, address strength deficits, analyze and address soft tissue restrictions, analyze and cue movement patterns, analyze and modify body mechanics/ergonomics, and assess and modify postural abnormalities to attain remaining goals. []  See Plan of Care  []  See progress note/recertification  []  See Discharge Summary         Progress towards goals / Updated goals:            1. Patient will improve FOTO to at least 77 to demonstrate improved function. PN: 72              Current:94 goal met 1/17/23               2. Patient will report being able to sit for 1 hour with pain no more then 1/10 to increase ease with work duties. PN: progressing but >1/10 at times.                Current: reports improvement but >1 at times 01-12-23              3. The pt will report no limitation with performing recreational activities.                PN: a little difficulty    PLAN  []  Upgrade activities as tolerated     [x]  Continue plan of care  []  Update interventions per flow sheet       []  Discharge due to:_  []  Other:_      Derrick Clemente, PT 1/19/2023  7:43 AM    Future Appointments   Date Time Provider Mike Suárez   1/23/2023  7:30 AM Good Mg, PT [facility]   1/26/2023  7:30 AM Good Mg, PT [facility]   1/30/2023  9:00 AM David Scruggs, PT [facility]   2/2/2023  2:30 PM David Scruggs, PT [facility]

## 2023-01-23 ENCOUNTER — HOSPITAL ENCOUNTER (OUTPATIENT)
Dept: PHYSICAL THERAPY | Age: 43
Discharge: HOME | End: 2023-01-23
Attending: FAMILY MEDICINE
Payer: COMMERCIAL

## 2023-01-23 PROCEDURE — 97110 THERAPEUTIC EXERCISES: CPT

## 2023-01-23 PROCEDURE — 97112 NEUROMUSCULAR REEDUCATION: CPT

## 2023-01-23 NOTE — PROGRESS NOTES
PT DAILY TREATMENT NOTE     Patient Name: Sandra Rodriguez  Date:2023  : 1980  [x]  Patient  Verified  Payor: Sharon Yusuf / Plan: Con Lunger PPO / Product Type: PPO /    In KEYN:[id number]  Out FYXT:1406  Total Treatment Time (min): 43  Visit #: 5 of 8    Treatment Area: Muscle spasm of back [M62.830]    SUBJECTIVE  Pain Level (0-10 scale): 0  Any medication changes, allergies to medications, adverse drug reactions, diagnosis change, or new procedure performed?: [x] No    [] Yes (see summary sheet for update)  Subjective functional status/changes:   [] No changes reported  The pt reports his back has been feeling pretty good. OBJECTIVE      20 min Therapeutic Exercise:  [x] See flow sheet :   Rationale: increase ROM and increase strength to improve the patients ability to perform ADL    23 min Neuromuscular Re-education:  [x]  See flow sheet :   Rationale: increase strength, improve coordination, and increase proprioception  to improve the patients ability to perform ADL                With   [] TE   [] TA   [] neuro   [] other: Patient Education: [x] Review HEP    [] Progressed/Changed HEP based on:   [] positioning   [] body mechanics   [] transfers   [] heat/ice application    [] other:      Other Objective/Functional Measures: held manual     Pain Level (0-10 scale) post treatment: 0    ASSESSMENT/Changes in Function: The pt demonstrates excellent progress with PT and will likely be ready for discharge following next session. Manual and DN therapy was held today to see how pt will respond over the next week. If his symptoms remain well controlled, he will likely be discharged at that time.      Patient will continue to benefit from skilled PT services to modify and progress therapeutic interventions, address functional mobility deficits, address ROM deficits, address strength deficits, analyze and address soft tissue restrictions, and analyze and cue movement patterns to attain remaining goals. []  See Plan of Care  []  See progress note/recertification  []  See Discharge Summary         Progress towards goals / Updated goals:           1. Patient will improve FOTO to at least 77 to demonstrate improved function. PN: 72              Current:94 goal met 1/17/23               2. Patient will report being able to sit for 1 hour with pain no more then 1/10 to increase ease with work duties. PN: progressing but >1/10 at times. Current: reports improvement but >1 at times 01-12-23              3. The pt will report no limitation with performing recreational activities.                PN: a little difficulty    PLAN  []  Upgrade activities as tolerated     [x]  Continue plan of care  []  Update interventions per flow sheet       []  Discharge due to:_  []  Other:_      Rajendra Boles, PT 1/23/2023  7:41 AM    Future Appointments   Date Time Provider Mike Suárez   1/26/2023  7:30 AM [facility]   1/30/2023  9:00 AM [facility]   2/2/2023  2:30 PM [facility]

## 2023-01-26 ENCOUNTER — APPOINTMENT (OUTPATIENT)
Dept: PHYSICAL THERAPY | Age: 43
End: 2023-01-26
Attending: FAMILY MEDICINE
Payer: COMMERCIAL

## 2023-01-30 ENCOUNTER — HOSPITAL ENCOUNTER (OUTPATIENT)
Dept: PHYSICAL THERAPY | Age: 43
Discharge: HOME | End: 2023-01-30
Attending: FAMILY MEDICINE
Payer: COMMERCIAL

## 2023-01-30 PROCEDURE — 97110 THERAPEUTIC EXERCISES: CPT

## 2023-01-30 PROCEDURE — 97140 MANUAL THERAPY 1/> REGIONS: CPT

## 2023-01-30 PROCEDURE — 97112 NEUROMUSCULAR REEDUCATION: CPT

## 2023-01-30 PROCEDURE — 20560 NDL INSJ W/O NJX 1 OR 2 MUSC: CPT

## 2023-01-30 NOTE — PROGRESS NOTES
PT DISCHARGE DAILY NOTE AND [id number]    Patient name: Socorro Segal Start of Care: 22   Referral source: Ramesh Mcneill MD : 1980   Medical/Treatment Diagnosis: Muscle spasm of back [M62.830] Onset Date:22     Prior Hospitalization: see medical history Provider#: [id number]   Medications: Verified on Patient Summary List     Comorbidities: Arthritis, back pain, uses CPAP    Prior Level of Function: Patient stated he enjoys working out and is working out 3x/week at a cross fit type of gym. Patient stated working out typically provides relief. Visits from Start of Care: 14    Missed Visits: 0    Reporting Period : 23 to 23    Date:2023  : 1980  [x]  Patient  Verified  Payor: Song Roa / Plan: Savilla [employer] PPO / Product Type: PPO /    In time:900  Out time:952  Total Treatment Time (min): 52  Visit #: 6 of 8        SUBJECTIVE  Pain Level (0-10 scale): 4  Any medication changes, allergies to medications, adverse drug reactions, diagnosis change, or new procedure performed?: [x] No    [] Yes (see summary sheet for update)  Subjective functional status/changes:   [] No changes reported  I woke up today with my back feeling really tight. I think it's my mattress, so I'm going to get a new topper.      OBJECTIVE    Modality rationale: decrease pain to improve the patients ability to tolerate post needle soreness   Min Type Additional Details   10 [x]  Ice     []  heat  []  Ice massage  []  Laser   []  Anodyne Position:  Location:   [] Skin assessment post-treatment:  []intact []redness- no adverse reaction        24 min Therapeutic Exercise:  [x] See flow sheet :   Rationale: increase ROM and increase strength to improve the patients ability to perform daily activities     8 min Neuromuscular Re-education:  [x]  See flow sheet :   Rationale: increase strength  to improve the patients ability to recruit TA for daily activities     8 min Manual Therapy:  IMDN to include education, assessment, set-up, palpation, hemostasis, STM/stretch to treated areas and reassessment only   The manual therapy interventions were performed at a separate and distinct time from the therapeutic activities interventions. Rationale: decrease pain, increase tissue extensibility, and decrease trigger points to relax mm for daily activities   2 min Dry Needling:   [x]  CPT 27947:  needle insertion(s) without injection(s); 1 or 2 muscle(s)  []  CPT 14836:  needle insertion(s) without injection(s); 3 or more muscles. Rationale: decrease pain, increase ROM, increase tissue extensibility, and decrease trigger points to relax mm for daily activities     Dry Needling Procedure Note    Procedure: An intramuscular manual therapy (dry needling) and a neuro-muscular re-education treatment was done to deactivate myofascial trigger points with a 30 gauge filament needle under aseptic technique.     Indications:  [x] Myofascial pain and dysfunction [] Muscled spasms  [x] Myalgia/myositis   [] Muscle cramps  [x] Muscle imbalances  [] Other:    Chart reviewed for the following:  IConchita, PT, have reviewed the medical history, summary list and precautions/contraindications for [address] performed immediately prior to start of procedure:  Conchita BRICENO PT, have performed the following reviews on Shannan Burgess prior to the start of the session:      [x] Verified patient identification by name and date of birth    [x] Agreement on all muscles being treated was verified   [x] Purpose of dry needling, side effects, possible complications, risks and benefits were explained to the patient   [x] Procedure site(s) verified  [x] Patient was positioned for comfort and draped for privacy  [x] Informed Consent was signed (initial visit) and verified verbally (subsequent visits)  [x] Patient was instructed on the need to report the use of blood thinners and/or immunosuppressant medications  [x] How to respond to possible adverse effects of treatment  [x] Self treatment of post needling soreness: ice, heat (moist heat, heat wraps) and stretching  [x] Opportunity was given to ask any questions, all questions were answered            Time: 900  Date of procedure: 1/30/2023    Treatment: The following muscles were treated today with intramuscular dry needling  [] Left [] Right Abdominals: Ant Rectus Abdominis  [] Left [] Right External Oblique / Internal Oblique / Transverse Abdominis  [] Left [] Right Thoracic Multifidi / Mirella Repine  [] Left [] Right Iliocostalis Janelle Daniela / Suzan Prost  [x] Left [x] Right Longissimus Thoracis / Suzan Prost  [] Left [] Right Lumbar Multifidi  [] Left [] Right Serratus Posterior Inferior  [] Left [] Right Quadratus Lumborum  [] Left [] Right Psoas  [] Left [] Right Iliacus  [] Left [] Right Iliopsoas (inguinal)  [] Left [] Right Piriformis  [] Left [] Right Quadratus Femoris  [] Left [] Right Radha House / Jenedson Dart / Maria Teresa Cleaning  [] Left [] Right Obturator Internus  [] Left [] Right Obturator Externus / Mamie Bjornstad / Inferior Gemellus    [] Left [] Right Tensor Fasciae Annie  [] Left [] Right Iliotibial Band  [] Left [] Right Pectineus  [] Left [] Right Sartorius  [] Left [] Right Gracilis  [] Left [] Right Adductor Brevis / Adductor Longus / Adductor Orest Aver  [] Left [] Right Rectus Femoris / Vastus Lateralis / Vastus Intermedius / Vastus Medialis Obliquus  [] Left [] Right Tibialis Anterior / Posterior  [] Left [] Right Extensor Digitorum Longus / Brevis  [] Left [] Right Extensor Hallucis Longus / Brevis  [] Left [] Right Hamstrings: Biceps Femoris / Sophy Bliss / Semimembranosis  [] Left [] Right Popliteus / Planteris  [] Left [] Right Gastrocnemius Medial / Lateral  [] Left [] Right Soleus  [] Left [] Right Peronei: Longus / Brevis / Tertius  [] Left [] Right Flexor Digitorum Longus / Brevis  [] Left [] Right Flexor Hallucis Longus / Brevis  [] Left [] Right Quadratus Plantae  [] Left [] Right Abductor Digiti Minimi  [] Left [] Right Foot Interossei  [] Left [] Right Other:    Patient's response to today's treatment:  [x] Latent Twitch Response  [x] Muscle relaxation [x] Pain Relief  [x] Post needling soreness [x] without complications  [] Increased Range of Motion  [] Other:     Performed by: Michael Ferro PT          With   [] TE   [] TA   [] neuro   [] other: Patient Education: [x] Review HEP    [] Progressed/Changed HEP based on:   [] positioning   [] body mechanics   [] transfers   [] heat/ice application    [] other:      Other Objective/Functional Measures:      Pain Level (0-10 scale) post treatment: 2    Summary of Care:           1. Patient will improve FOTO to at least 77 to demonstrate improved function. PN: 72              Current:94 goal met                2. Patient will report being able to sit for 1 hour with pain no more then 1/10 to increase ease with work duties. PN: progressing but >1/10 at times. Current: reports improvement but >1 at times               3. The pt will report no limitation with performing recreational activities. PN: a little difficulty   Current:no difficulty- goal met  ASSESSMENT/Changes in Function: Patient has met all goals and will continue HEP for maintenance.      Thank you for this referral!      PLAN  [x]Discontinue therapy: [x]Patient has reached or is progressing toward set goals      []Patient is non-compliant or has abdicated      []Due to lack of appreciable progress towards set Ul. Donald Hernandez, PT 1/30/2023  8:36 AM

## 2023-02-02 ENCOUNTER — APPOINTMENT (OUTPATIENT)
Dept: PHYSICAL THERAPY | Age: 43
End: 2023-02-02
Attending: FAMILY MEDICINE

## 2023-12-05 ENCOUNTER — OFFICE VISIT (OUTPATIENT)
Age: 43
End: 2023-12-05
Payer: COMMERCIAL

## 2023-12-05 VITALS
OXYGEN SATURATION: 99 % | BODY MASS INDEX: 25.92 KG/M2 | DIASTOLIC BLOOD PRESSURE: 66 MMHG | RESPIRATION RATE: 18 BRPM | WEIGHT: 175 LBS | SYSTOLIC BLOOD PRESSURE: 110 MMHG | HEIGHT: 69 IN | HEART RATE: 80 BPM | TEMPERATURE: 98 F

## 2023-12-05 DIAGNOSIS — Z99.89 DEPENDENCE ON OTHER ENABLING MACHINES AND DEVICES: ICD-10-CM

## 2023-12-05 DIAGNOSIS — Z13.220 ENCOUNTER FOR SCREENING FOR LIPOID DISORDERS: ICD-10-CM

## 2023-12-05 DIAGNOSIS — G47.33 OBSTRUCTIVE SLEEP APNEA (ADULT) (PEDIATRIC): ICD-10-CM

## 2023-12-05 DIAGNOSIS — Z00.00 WELL ADULT EXAM: Primary | ICD-10-CM

## 2023-12-05 PROCEDURE — 99396 PREV VISIT EST AGE 40-64: CPT | Performed by: FAMILY MEDICINE

## 2023-12-05 SDOH — ECONOMIC STABILITY: HOUSING INSECURITY
IN THE LAST 12 MONTHS, WAS THERE A TIME WHEN YOU DID NOT HAVE A STEADY PLACE TO SLEEP OR SLEPT IN A SHELTER (INCLUDING NOW)?: NO

## 2023-12-05 SDOH — ECONOMIC STABILITY: FOOD INSECURITY: WITHIN THE PAST 12 MONTHS, THE FOOD YOU BOUGHT JUST DIDN'T LAST AND YOU DIDN'T HAVE MONEY TO GET MORE.: NEVER TRUE

## 2023-12-05 SDOH — ECONOMIC STABILITY: INCOME INSECURITY: HOW HARD IS IT FOR YOU TO PAY FOR THE VERY BASICS LIKE FOOD, HOUSING, MEDICAL CARE, AND HEATING?: NOT HARD AT ALL

## 2023-12-05 SDOH — ECONOMIC STABILITY: FOOD INSECURITY: WITHIN THE PAST 12 MONTHS, YOU WORRIED THAT YOUR FOOD WOULD RUN OUT BEFORE YOU GOT MONEY TO BUY MORE.: NEVER TRUE

## 2023-12-05 ASSESSMENT — PATIENT HEALTH QUESTIONNAIRE - PHQ9
SUM OF ALL RESPONSES TO PHQ QUESTIONS 1-9: 0
SUM OF ALL RESPONSES TO PHQ9 QUESTIONS 1 & 2: 0
9. THOUGHTS THAT YOU WOULD BE BETTER OFF DEAD, OR OF HURTING YOURSELF: 0
2. FEELING DOWN, DEPRESSED OR HOPELESS: 0
1. LITTLE INTEREST OR PLEASURE IN DOING THINGS: 0
3. TROUBLE FALLING OR STAYING ASLEEP: 0
SUM OF ALL RESPONSES TO PHQ QUESTIONS 1-9: 0
10. IF YOU CHECKED OFF ANY PROBLEMS, HOW DIFFICULT HAVE THESE PROBLEMS MADE IT FOR YOU TO DO YOUR WORK, TAKE CARE OF THINGS AT HOME, OR GET ALONG WITH OTHER PEOPLE: 0
5. POOR APPETITE OR OVEREATING: 0
SUM OF ALL RESPONSES TO PHQ QUESTIONS 1-9: 0
SUM OF ALL RESPONSES TO PHQ QUESTIONS 1-9: 0
6. FEELING BAD ABOUT YOURSELF - OR THAT YOU ARE A FAILURE OR HAVE LET YOURSELF OR YOUR FAMILY DOWN: 0
4. FEELING TIRED OR HAVING LITTLE ENERGY: 0
7. TROUBLE CONCENTRATING ON THINGS, SUCH AS READING THE NEWSPAPER OR WATCHING TELEVISION: 0
8. MOVING OR SPEAKING SO SLOWLY THAT OTHER PEOPLE COULD HAVE NOTICED. OR THE OPPOSITE, BEING SO FIGETY OR RESTLESS THAT YOU HAVE BEEN MOVING AROUND A LOT MORE THAN USUAL: 0

## 2023-12-22 ENCOUNTER — TELEPHONE (OUTPATIENT)
Age: 43
End: 2023-12-22

## 2023-12-22 NOTE — TELEPHONE ENCOUNTER
Pt states that he tweaked his right knee and is getting a sharp pain. He  would like it looked at. Available 12/27-30th . Please advise.

## 2023-12-26 NOTE — TELEPHONE ENCOUNTER
Spoke with Mr. Antonia Sullivan to advised that he has been scheduled for   December 27,2023 @ 2:15 pm

## 2023-12-26 NOTE — PROGRESS NOTES
1. \"Have you been to the ER, urgent care clinic since your last visit? Hospitalized since your last visit? \" No    2. \"Have you seen or consulted any other health care providers outside of the [address] [place] [address] since your last visit? \" No     3. For patients aged 43-73: Has the patient had a colonoscopy / FIT/ Cologuard?  NA - based on age

## 2023-12-27 ENCOUNTER — OFFICE VISIT (OUTPATIENT)
Age: 43
End: 2023-12-27
Payer: COMMERCIAL

## 2023-12-27 VITALS
TEMPERATURE: 98.6 F | HEIGHT: 69 IN | HEART RATE: 71 BPM | WEIGHT: 180.2 LBS | SYSTOLIC BLOOD PRESSURE: 114 MMHG | DIASTOLIC BLOOD PRESSURE: 62 MMHG | RESPIRATION RATE: 16 BRPM | OXYGEN SATURATION: 98 % | BODY MASS INDEX: 26.69 KG/M2

## 2023-12-27 DIAGNOSIS — M25.561 ACUTE PAIN OF RIGHT KNEE: Primary | ICD-10-CM

## 2023-12-27 PROCEDURE — 99212 OFFICE O/P EST SF 10 MIN: CPT | Performed by: FAMILY MEDICINE

## 2023-12-27 NOTE — PATIENT INSTRUCTIONS
Knee: Exercises  Introduction  Here are some examples of exercises for you to try. The exercises may be suggested for a condition or for rehabilitation. Start each exercise slowly. Ease off the exercises if you start to have pain. You will be told when to start these exercises and which ones will work best for you. How to do the exercises  Quad sets  Sit with your leg straight and supported on the floor or a firm bed. (If you feel discomfort in the front or back of your knee, place a small towel roll under your knee.)  Tighten the muscles on top of your thigh by pressing the back of your knee flat down to the floor. (If you feel discomfort under your kneecap, place a small towel roll under your knee.)  Hold for about 6 seconds, then rest for up to 10 seconds. Do 8 to 12 repetitions several times a day. Straight-leg raises to the front  Lie on your back with your good knee bent so that your foot rests flat on the floor. Your injured leg should be straight. Make sure that your low back has a normal curve. You should be able to slip your flat hand in between the floor and the small of your back, with your palm touching the floor and your back touching the back of your hand. Tighten the thigh muscles in the injured leg by pressing the back of your knee flat down to the floor. Hold your knee straight. Keeping the thigh muscles tight, lift your injured leg up so that your heel is about 12 inches off the floor. Hold for about 6 seconds and then lower slowly. Do 8 to 12 repetitions, 3 times a day. Straight-leg raises to the outside  Lie on your side, with your injured leg on top. Tighten the front thigh muscles of your injured leg to keep your knee straight. Keep your hip and your leg straight in line with the rest of your body, and keep your knee pointing forward. Do not drop your hip back. Lift your injured leg straight up toward the ceiling, about 12 inches off the floor.  Hold for about 6 seconds, then

## 2024-01-17 ENCOUNTER — HOSPITAL ENCOUNTER (OUTPATIENT)
Facility: HOSPITAL | Age: 44
Discharge: HOME | End: 2024-01-20
Payer: COMMERCIAL

## 2024-01-17 DIAGNOSIS — Z13.220 ENCOUNTER FOR SCREENING FOR LIPOID DISORDERS: ICD-10-CM

## 2024-01-17 DIAGNOSIS — Z00.00 WELL ADULT EXAM: ICD-10-CM

## 2024-01-17 LAB
ALBUMIN SERPL-MCNC: 4.2 G/DL (ref 3.4–5)
ALBUMIN/GLOB SERPL: 1.2 (ref 0.8–1.7)
ALP SERPL-CCNC: 68 U/L (ref 45–117)
ALT SERPL-CCNC: 34 U/L (ref 16–61)
ANION GAP SERPL CALC-SCNC: 3 MMOL/L (ref 3–18)
AST SERPL-CCNC: 25 U/L (ref 10–38)
BILIRUB SERPL-MCNC: 0.6 MG/DL (ref 0.2–1)
BUN SERPL-MCNC: 17 MG/DL (ref 7–18)
BUN/CREAT SERPL: 17 (ref 12–20)
CALCIUM SERPL-MCNC: 9.4 MG/DL (ref 8.5–10.1)
CHLORIDE SERPL-SCNC: 106 MMOL/L (ref 100–111)
CHOLEST SERPL-MCNC: 196 MG/DL
CO2 SERPL-SCNC: 28 MMOL/L (ref 21–32)
CREAT SERPL-MCNC: 1.01 MG/DL (ref 0.6–1.3)
ERYTHROCYTE [DISTWIDTH] IN BLOOD BY AUTOMATED COUNT: 11.9 % (ref 11.6–14.5)
GLOBULIN SER CALC-MCNC: 3.4 G/DL (ref 2–4)
GLUCOSE SERPL-MCNC: 99 MG/DL (ref 74–99)
HCT VFR BLD AUTO: 45.9 % (ref 36–48)
HDLC SERPL-MCNC: 76 MG/DL (ref 40–60)
HDLC SERPL: 2.6 (ref 0–5)
HGB BLD-MCNC: 15.8 G/DL (ref 13–16)
LDLC SERPL CALC-MCNC: 96.6 MG/DL (ref 0–100)
LIPID PANEL: ABNORMAL
MCH RBC QN AUTO: 31.5 PG (ref 24–34)
MCHC RBC AUTO-ENTMCNC: 34.4 G/DL (ref 31–37)
MCV RBC AUTO: 91.4 FL (ref 78–100)
NRBC # BLD: 0 K/UL (ref 0–0.01)
NRBC BLD-RTO: 0 PER 100 WBC
PLATELET # BLD AUTO: 331 K/UL (ref 135–420)
PMV BLD AUTO: 8.6 FL (ref 9.2–11.8)
POTASSIUM SERPL-SCNC: 4 MMOL/L (ref 3.5–5.5)
PROT SERPL-MCNC: 7.6 G/DL (ref 6.4–8.2)
RBC # BLD AUTO: 5.02 M/UL (ref 4.35–5.65)
SODIUM SERPL-SCNC: 137 MMOL/L (ref 136–145)
TRIGL SERPL-MCNC: 117 MG/DL
VLDLC SERPL CALC-MCNC: 23.4 MG/DL
WBC # BLD AUTO: 6.8 K/UL (ref 4.6–13.2)

## 2024-01-17 PROCEDURE — 80053 COMPREHEN METABOLIC PANEL: CPT

## 2024-01-17 PROCEDURE — 80061 LIPID PANEL: CPT

## 2024-01-17 PROCEDURE — 36415 COLL VENOUS BLD VENIPUNCTURE: CPT

## 2024-01-17 PROCEDURE — 85027 COMPLETE CBC AUTOMATED: CPT

## 2024-01-31 ENCOUNTER — PATIENT MESSAGE (OUTPATIENT)
Age: 44
End: 2024-01-31

## 2024-01-31 RX ORDER — AZITHROMYCIN 250 MG/1
250 TABLET, FILM COATED ORAL SEE ADMIN INSTRUCTIONS
Qty: 6 TABLET | Refills: 0 | Status: SHIPPED | OUTPATIENT
Start: 2024-01-31 | End: 2024-02-05

## 2024-01-31 NOTE — TELEPHONE ENCOUNTER
From: Manuel Baig Jr  To: Dr. Bean Rowland  Sent: 1/31/2024 3:39 AM EST  Subject: Medication    Good morning Dr. Rowland. I've been fighting a cold for about 2 weeks now with over the counter medicine. Nothing seems to be working. My sinuses are killing me causing pressure on my face and I have a sore throat. I don't know if I have a sinus infection. Would it be possible to get prescribed a Z pack or something to help fight it? Thank you.

## 2025-01-07 ENCOUNTER — OFFICE VISIT (OUTPATIENT)
Facility: CLINIC | Age: 45
End: 2025-01-07
Payer: COMMERCIAL

## 2025-01-07 VITALS
BODY MASS INDEX: 26.66 KG/M2 | HEART RATE: 70 BPM | WEIGHT: 180 LBS | SYSTOLIC BLOOD PRESSURE: 110 MMHG | RESPIRATION RATE: 18 BRPM | DIASTOLIC BLOOD PRESSURE: 72 MMHG | HEIGHT: 69 IN | OXYGEN SATURATION: 99 % | TEMPERATURE: 98 F

## 2025-01-07 DIAGNOSIS — G47.33 OBSTRUCTIVE SLEEP APNEA (ADULT) (PEDIATRIC): Primary | ICD-10-CM

## 2025-01-07 DIAGNOSIS — Z13.220 ENCOUNTER FOR SCREENING FOR LIPOID DISORDERS: ICD-10-CM

## 2025-01-07 DIAGNOSIS — Z99.89 DEPENDENCE ON OTHER ENABLING MACHINES AND DEVICES: ICD-10-CM

## 2025-01-07 DIAGNOSIS — Z00.00 WELL ADULT EXAM: ICD-10-CM

## 2025-01-07 DIAGNOSIS — R19.5 LOOSE STOOLS: ICD-10-CM

## 2025-01-07 PROCEDURE — 99396 PREV VISIT EST AGE 40-64: CPT | Performed by: FAMILY MEDICINE

## 2025-01-07 RX ORDER — TRETINOIN 0.5 MG/G
CREAM TOPICAL NIGHTLY
COMMUNITY
Start: 2025-01-03

## 2025-01-07 SDOH — ECONOMIC STABILITY: FOOD INSECURITY: WITHIN THE PAST 12 MONTHS, THE FOOD YOU BOUGHT JUST DIDN'T LAST AND YOU DIDN'T HAVE MONEY TO GET MORE.: NEVER TRUE

## 2025-01-07 SDOH — ECONOMIC STABILITY: INCOME INSECURITY: HOW HARD IS IT FOR YOU TO PAY FOR THE VERY BASICS LIKE FOOD, HOUSING, MEDICAL CARE, AND HEATING?: NOT HARD AT ALL

## 2025-01-07 SDOH — ECONOMIC STABILITY: FOOD INSECURITY: WITHIN THE PAST 12 MONTHS, YOU WORRIED THAT YOUR FOOD WOULD RUN OUT BEFORE YOU GOT MONEY TO BUY MORE.: NEVER TRUE

## 2025-01-07 ASSESSMENT — PATIENT HEALTH QUESTIONNAIRE - PHQ9
SUM OF ALL RESPONSES TO PHQ9 QUESTIONS 1 & 2: 0
SUM OF ALL RESPONSES TO PHQ QUESTIONS 1-9: 0
SUM OF ALL RESPONSES TO PHQ QUESTIONS 1-9: 0
2. FEELING DOWN, DEPRESSED OR HOPELESS: NOT AT ALL
SUM OF ALL RESPONSES TO PHQ QUESTIONS 1-9: 0
SUM OF ALL RESPONSES TO PHQ QUESTIONS 1-9: 0
1. LITTLE INTEREST OR PLEASURE IN DOING THINGS: NOT AT ALL

## 2025-01-07 NOTE — PROGRESS NOTES
\"Have you been to the ER, urgent care clinic since your last visit?  Hospitalized since your last visit?\"    NO    “Have you seen or consulted any other health care providers outside our system since your last visit?”    NO      No updated immunization noted on [place] Immunization Registry as of 01/06/2025

## 2025-01-07 NOTE — PROGRESS NOTES
Chief Complaint   Patient presents with    Annual Exam    Results     Review of results      Subjective:   Manuel Baig Jr is a 44 y.o. male presenting for his annual checkup.    ROS:  Complete 10 system ROS is obtained from the patient which is negative other than HPI    Specific concerns today:     He is using CPAP.  He does have a URIAH diagnosis.      Back pain doing well.    Migraine are doing well.  Takes zofran and imitrex as needed.    Allergies intermittent.     Loose stools intermittently since cruise in late summer.  No abd pains.  Going more frequently, sometimes diarrhea.    No Known Drug Allergies    Current Outpatient Medications   Medication Sig Dispense Refill    tretinoin (RETIN-A) 0.05 % cream Apply topically nightly      levocetirizine (XYZAL) 5 MG tablet Take 1 tablet by mouth daily as needed      ondansetron (ZOFRAN-ODT) 4 MG disintegrating tablet Take 1 tablet by mouth every 8 hours as needed      SUMAtriptan (IMITREX) 50 MG tablet Take 1 tablet PRN for migraine and repeat in about 2 hours if needed       No current facility-administered medications for this visit.       Past Medical History:   Diagnosis Date    Allergic rhinitis     COVID-19 03/2021    H/O colonoscopy with polypectomy 06/28/2024    follow-up 5 years    Mobitz (type) I (Wenckebach's) atrioventricular block     saw cardiology, listed in A/P of their note    URIAH on CPAP 2020     Past Surgical History:   Procedure Laterality Date    WISDOM TOOTH EXTRACTION       Family History   Problem Relation Age of Onset    Diabetes Mother     Other Mother         pacemaker    Coronary Art Dis Mother     Arthritis Mother         djd    High Cholesterol Mother     Heart Disease Father         CABG in his 60s    Diabetes Sister      Social History     Tobacco Use    Smoking status: Never    Smokeless tobacco: Never   Vaping Use    Vaping status: Never Used   Substance Use Topics    Alcohol use: Yes     Alcohol/week: 2.0 standard

## 2025-01-07 NOTE — PATIENT INSTRUCTIONS
[employer], [facility] disclaims any warranty or liability for your use of this information.       Patient Education        Well Visit, Ages 18 to 65: Care Instructions  Well visits can help you stay healthy. Your doctor has checked your overall health and may have suggested ways to take good care of yourself. Your doctor also may have recommended tests. You can help prevent illness with healthy eating, good sleep, vaccinations, regular exercise, and other steps.    Get the tests that you and your doctor decide on. Depending on your age and risks, examples might include screening for diabetes; hepatitis C; HIV; and cervical, breast, lung, and colon cancer. Screening helps find diseases before any symptoms appear.   Eat healthy foods. Choose fruits, vegetables, whole grains, lean protein, and low-fat dairy foods. Limit saturated fat and reduce salt.     Limit alcohol. Men should have no more than 2 drinks a day. Women should have no more than 1. For some people, no alcohol is the best choice.   Exercise. Get at least 30 minutes of exercise on most days of the week. Walking can be a good choice.     Reach and stay at your healthy weight. This will lower your risk for many health problems.   Take care of your mental health. Try to stay connected with friends, family, and community, and find ways to manage stress.     If you're feeling depressed or hopeless, talk to someone. A counselor can help. If you don't have a counselor, talk to your doctor.   Talk to your doctor if you think you may have a problem with alcohol or drug use. This includes prescription medicines, marijuana, and other drugs.     Avoid tobacco and nicotine: Don't smoke, vape, or chew. If you need help quitting, talk to your doctor.   Practice safer sex. Getting tested, using condoms or dental dams, and limiting sex partners can help prevent STIs.     Use birth control if it's important to you to prevent pregnancy. Talk with your doctor about your

## 2025-01-18 ENCOUNTER — HOSPITAL ENCOUNTER (OUTPATIENT)
Facility: HOSPITAL | Age: 45
Discharge: HOME | End: 2025-01-21
Payer: COMMERCIAL

## 2025-01-18 DIAGNOSIS — Z00.00 WELL ADULT EXAM: ICD-10-CM

## 2025-01-18 DIAGNOSIS — Z13.220 ENCOUNTER FOR SCREENING FOR LIPOID DISORDERS: ICD-10-CM

## 2025-01-18 DIAGNOSIS — R19.5 LOOSE STOOLS: ICD-10-CM

## 2025-01-18 LAB
ALBUMIN SERPL-MCNC: 4 G/DL (ref 3.4–5)
ALBUMIN/GLOB SERPL: 1.1 (ref 0.8–1.7)
ALP SERPL-CCNC: 60 U/L (ref 45–117)
ALT SERPL-CCNC: 29 U/L (ref 16–61)
ANION GAP SERPL CALC-SCNC: 3 MMOL/L (ref 3–18)
AST SERPL-CCNC: 28 U/L (ref 10–38)
BASOPHILS # BLD: 0.04 K/UL (ref 0–0.1)
BASOPHILS NFR BLD: 0.7 % (ref 0–2)
BILIRUB SERPL-MCNC: 0.5 MG/DL (ref 0.2–1)
BUN SERPL-MCNC: 16 MG/DL (ref 7–18)
BUN/CREAT SERPL: 15 (ref 12–20)
CALCIUM SERPL-MCNC: 9.3 MG/DL (ref 8.5–10.1)
CHLORIDE SERPL-SCNC: 106 MMOL/L (ref 100–111)
CHOLEST SERPL-MCNC: 188 MG/DL
CO2 SERPL-SCNC: 29 MMOL/L (ref 21–32)
CREAT SERPL-MCNC: 1.1 MG/DL (ref 0.6–1.3)
DIFFERENTIAL METHOD BLD: ABNORMAL
EOSINOPHIL # BLD: 0.09 K/UL (ref 0–0.4)
EOSINOPHIL NFR BLD: 1.5 % (ref 0–5)
ERYTHROCYTE [DISTWIDTH] IN BLOOD BY AUTOMATED COUNT: 12.2 % (ref 11.6–14.5)
GLOBULIN SER CALC-MCNC: 3.5 G/DL (ref 2–4)
GLUCOSE SERPL-MCNC: 88 MG/DL (ref 74–99)
HCT VFR BLD AUTO: 48 % (ref 36–48)
HDLC SERPL-MCNC: 63 MG/DL (ref 40–60)
HDLC SERPL: 3 (ref 0–5)
HGB BLD-MCNC: 15.4 G/DL (ref 13–16)
IMM GRANULOCYTES # BLD AUTO: 0.01 K/UL (ref 0–0.04)
IMM GRANULOCYTES NFR BLD AUTO: 0.2 % (ref 0–0.5)
LDLC SERPL CALC-MCNC: 101 MG/DL (ref 0–100)
LIPID PANEL: ABNORMAL
LYMPHOCYTES # BLD: 1.72 K/UL (ref 0.9–3.6)
LYMPHOCYTES NFR BLD: 29.6 % (ref 21–52)
MCH RBC QN AUTO: 30.4 PG (ref 24–34)
MCHC RBC AUTO-ENTMCNC: 32.1 G/DL (ref 31–37)
MCV RBC AUTO: 94.7 FL (ref 78–100)
MONOCYTES # BLD: 0.41 K/UL (ref 0.05–1.2)
MONOCYTES NFR BLD: 7.1 % (ref 3–10)
NEUTS SEG # BLD: 3.54 K/UL (ref 1.8–8)
NEUTS SEG NFR BLD: 60.9 % (ref 40–73)
NRBC # BLD: 0 K/UL (ref 0–0.01)
NRBC BLD-RTO: 0 PER 100 WBC
PLATELET # BLD AUTO: 377 K/UL (ref 135–420)
PMV BLD AUTO: 8.9 FL (ref 9.2–11.8)
POTASSIUM SERPL-SCNC: 4.7 MMOL/L (ref 3.5–5.5)
PROT SERPL-MCNC: 7.5 G/DL (ref 6.4–8.2)
RBC # BLD AUTO: 5.07 M/UL (ref 4.35–5.65)
SODIUM SERPL-SCNC: 138 MMOL/L (ref 136–145)
TRIGL SERPL-MCNC: 120 MG/DL
TSH SERPL DL<=0.05 MIU/L-ACNC: 1.01 UIU/ML (ref 0.36–3.74)
VLDLC SERPL CALC-MCNC: 24 MG/DL
WBC # BLD AUTO: 5.8 K/UL (ref 4.6–13.2)

## 2025-01-18 PROCEDURE — 85025 COMPLETE CBC W/AUTO DIFF WBC: CPT

## 2025-01-18 PROCEDURE — 36415 COLL VENOUS BLD VENIPUNCTURE: CPT

## 2025-01-18 PROCEDURE — 80053 COMPREHEN METABOLIC PANEL: CPT

## 2025-01-18 PROCEDURE — 84443 ASSAY THYROID STIM HORMONE: CPT

## 2025-01-18 PROCEDURE — 80061 LIPID PANEL: CPT

## 2025-03-09 ASSESSMENT — SLEEP AND FATIGUE QUESTIONNAIRES
HOW LIKELY ARE YOU TO NOD OFF OR FALL ASLEEP WHILE LYING DOWN TO REST IN THE AFTERNOON WHEN CIRCUMSTANCES PERMIT: MODERATE CHANCE OF DOZING
HOW LIKELY ARE YOU TO NOD OFF OR FALL ASLEEP WHILE SITTING INACTIVE IN A PUBLIC PLACE: SLIGHT CHANCE OF DOZING
HOW LIKELY ARE YOU TO NOD OFF OR FALL ASLEEP WHILE SITTING INACTIVE IN A PUBLIC PLACE: SLIGHT CHANCE OF DOZING
HOW LIKELY ARE YOU TO NOD OFF OR FALL ASLEEP WHILE SITTING AND TALKING TO SOMEONE: WOULD NEVER DOZE
HOW LIKELY ARE YOU TO NOD OFF OR FALL ASLEEP WHEN YOU ARE A PASSENGER IN A CAR FOR AN HOUR WITHOUT A BREAK: MODERATE CHANCE OF DOZING
HOW LIKELY ARE YOU TO NOD OFF OR FALL ASLEEP WHEN YOU ARE A PASSENGER IN A CAR FOR AN HOUR WITHOUT A BREAK: MODERATE CHANCE OF DOZING
HOW LIKELY ARE YOU TO NOD OFF OR FALL ASLEEP WHILE LYING DOWN TO REST IN THE AFTERNOON WHEN CIRCUMSTANCES PERMIT: MODERATE CHANCE OF DOZING
HOW LIKELY ARE YOU TO NOD OFF OR FALL ASLEEP WHILE WATCHING TV: SLIGHT CHANCE OF DOZING
ESS TOTAL SCORE: 7
HOW LIKELY ARE YOU TO NOD OFF OR FALL ASLEEP WHILE SITTING QUIETLY AFTER LUNCH WITHOUT ALCOHOL: WOULD NEVER DOZE
HOW LIKELY ARE YOU TO NOD OFF OR FALL ASLEEP WHILE SITTING AND TALKING TO SOMEONE: WOULD NEVER DOZE
HOW LIKELY ARE YOU TO NOD OFF OR FALL ASLEEP WHILE SITTING AND READING: SLIGHT CHANCE OF DOZING
HOW LIKELY ARE YOU TO NOD OFF OR FALL ASLEEP IN A CAR, WHILE STOPPED FOR A FEW MINUTES IN TRAFFIC: WOULD NEVER DOZE
HOW LIKELY ARE YOU TO NOD OFF OR FALL ASLEEP WHILE SITTING QUIETLY AFTER LUNCH WITHOUT ALCOHOL: WOULD NEVER DOZE
HOW LIKELY ARE YOU TO NOD OFF OR FALL ASLEEP WHILE WATCHING TV: SLIGHT CHANCE OF DOZING
HOW LIKELY ARE YOU TO NOD OFF OR FALL ASLEEP WHILE SITTING AND READING: SLIGHT CHANCE OF DOZING
HOW LIKELY ARE YOU TO NOD OFF OR FALL ASLEEP IN A CAR, WHILE STOPPED FOR A FEW MINUTES IN TRAFFIC: WOULD NEVER DOZE

## 2025-03-12 ENCOUNTER — OFFICE VISIT (OUTPATIENT)
Age: 45
End: 2025-03-12
Payer: COMMERCIAL

## 2025-03-12 VITALS
HEART RATE: 54 BPM | RESPIRATION RATE: 18 BRPM | TEMPERATURE: 98.2 F | DIASTOLIC BLOOD PRESSURE: 68 MMHG | WEIGHT: 184.2 LBS | OXYGEN SATURATION: 97 % | HEIGHT: 69 IN | BODY MASS INDEX: 27.28 KG/M2 | SYSTOLIC BLOOD PRESSURE: 118 MMHG

## 2025-03-12 DIAGNOSIS — Z99.89 CPAP (CONTINUOUS POSITIVE AIRWAY PRESSURE) DEPENDENCE: Primary | ICD-10-CM

## 2025-03-12 DIAGNOSIS — G47.33 OSA (OBSTRUCTIVE SLEEP APNEA): ICD-10-CM

## 2025-03-12 PROCEDURE — 99204 OFFICE O/P NEW MOD 45 MIN: CPT | Performed by: INTERNAL MEDICINE

## 2025-03-12 NOTE — PROGRESS NOTES
Manuel Baig Jr presents today for   Chief Complaint   Patient presents with    New Patient     Obstructive sleep apena/ Mask discomfort       Is someone accompanying this pt? No    Is the patient using any DME equipment during OV? Cpap    -D[employer]     Depression Screenin/7/2025    10:01 AM   PHQ-9 Questionaire   Little interest or pleasure in doing things 0   Feeling down, depressed, or hopeless 0   PHQ-9 Total Score 0       Learning Assessment:    Failed to redirect to the Timeline version of the [employer] SmartLink.    Abuse Screening:         No data to display                Fall Risk    Failed to redirect to the Timeline version of the [employer] SmartLink.    Coordination of Care:    1. Have you been to the ER, urgent care clinic since your last visit? Hospitalized since your last visit? No    2. Have you seen or consulted any other health care providers outside of the [facility] since your last visit? Include any pap smears or colon screening. No    Medication list has been update per patient.

## 2025-03-12 NOTE — PROGRESS NOTES
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  Currently on CPAP and doing well  Improvement in symptoms of sleepiness, caffeine intake, effort sleepiness score from 13-6, fatigue, and other symptoms noted  Current machine is about 5 years old, will continue with auto CPAP for now and follow-up in 1 year  CPAP Dependency:   Current DME company is [employer]  Currently on AutoSet , CPAP titration showed CPAP of 8 being optimal,  Currently using median pressure of 6.6 with max pressure of 9  Currently on a nasal mask like it however has some time to keep the mouth open even with a chinstrap  As there is no evidence of using a tape on the face to see whether that keeps the mouth close and helps with overall treatment of URIAH advised patient to try that on  Supply orders were sent to [employer]  Sleep paralysis/hypnopompic hallucination:   Status post MSLT did not show any sleep onset rems  Patient had slightly reduced sleep latency of 4.4-minute on MSLT however patient has marked improvement in symptoms after CPAP  Again history reviewed no clear history of cataplexy on the patient  We will continue to monitor patient as patient has improvement in symptoms improvement in sleep paralysis and hallucination with CPAP treatment continue to monitor for now   Plan:  DME supply orders to [employer]  Sleep hygiene discussed continue to follow in 1 year  Sleep hygiene measures were discussed with patient at length.   and workplace safety reviewed.  Drowsy and inattentive driving should be avoided.  Follow Up Visit 1 year   Orders: Labs/sleep study  Orders Placed This Encounter